# Patient Record
Sex: MALE | Race: WHITE | NOT HISPANIC OR LATINO | URBAN - METROPOLITAN AREA
[De-identification: names, ages, dates, MRNs, and addresses within clinical notes are randomized per-mention and may not be internally consistent; named-entity substitution may affect disease eponyms.]

---

## 2017-07-30 ENCOUNTER — INPATIENT (INPATIENT)
Facility: HOSPITAL | Age: 37
LOS: 2 days | Discharge: HOME | End: 2017-08-02
Attending: SURGERY | Admitting: SURGERY
Payer: COMMERCIAL

## 2017-07-30 VITALS
RESPIRATION RATE: 18 BRPM | TEMPERATURE: 103 F | HEART RATE: 128 BPM | SYSTOLIC BLOOD PRESSURE: 100 MMHG | OXYGEN SATURATION: 100 % | DIASTOLIC BLOOD PRESSURE: 50 MMHG

## 2017-07-30 DIAGNOSIS — K61.1 RECTAL ABSCESS: ICD-10-CM

## 2017-07-30 LAB
ALBUMIN SERPL ELPH-MCNC: 4.2 G/DL — SIGNIFICANT CHANGE UP (ref 3.3–5)
ALP SERPL-CCNC: 52 U/L — SIGNIFICANT CHANGE UP (ref 40–120)
ALT FLD-CCNC: 17 U/L — SIGNIFICANT CHANGE UP (ref 4–41)
AST SERPL-CCNC: 13 U/L — SIGNIFICANT CHANGE UP (ref 4–40)
BASE EXCESS BLDV CALC-SCNC: -0.5 MMOL/L — SIGNIFICANT CHANGE UP
BASE EXCESS BLDV CALC-SCNC: 3.9 MMOL/L — SIGNIFICANT CHANGE UP
BASOPHILS # BLD AUTO: 0.04 K/UL — SIGNIFICANT CHANGE UP (ref 0–0.2)
BASOPHILS NFR BLD AUTO: 0.2 % — SIGNIFICANT CHANGE UP (ref 0–2)
BILIRUB SERPL-MCNC: 1.3 MG/DL — HIGH (ref 0.2–1.2)
BLOOD GAS VENOUS - CREATININE: 0.98 MG/DL — SIGNIFICANT CHANGE UP (ref 0.5–1.3)
BLOOD GAS VENOUS - CREATININE: 1.17 MG/DL — SIGNIFICANT CHANGE UP (ref 0.5–1.3)
BUN SERPL-MCNC: 10 MG/DL — SIGNIFICANT CHANGE UP (ref 7–23)
CALCIUM SERPL-MCNC: 9 MG/DL — SIGNIFICANT CHANGE UP (ref 8.4–10.5)
CHLORIDE BLDV-SCNC: 103 MMOL/L — SIGNIFICANT CHANGE UP (ref 96–108)
CHLORIDE BLDV-SCNC: 106 MMOL/L — SIGNIFICANT CHANGE UP (ref 96–108)
CHLORIDE SERPL-SCNC: 99 MMOL/L — SIGNIFICANT CHANGE UP (ref 98–107)
CO2 SERPL-SCNC: 27 MMOL/L — SIGNIFICANT CHANGE UP (ref 22–31)
CREAT SERPL-MCNC: 1.22 MG/DL — SIGNIFICANT CHANGE UP (ref 0.5–1.3)
EOSINOPHIL # BLD AUTO: 0.03 K/UL — SIGNIFICANT CHANGE UP (ref 0–0.5)
EOSINOPHIL NFR BLD AUTO: 0.2 % — SIGNIFICANT CHANGE UP (ref 0–6)
GAS PNL BLDV: 134 MMOL/L — LOW (ref 136–146)
GAS PNL BLDV: 137 MMOL/L — SIGNIFICANT CHANGE UP (ref 136–146)
GLUCOSE BLDV-MCNC: 159 — HIGH (ref 70–99)
GLUCOSE BLDV-MCNC: 223 — HIGH (ref 70–99)
GLUCOSE SERPL-MCNC: 208 MG/DL — HIGH (ref 70–99)
GRAM STN SPEC: SIGNIFICANT CHANGE UP
HCO3 BLDV-SCNC: 24 MMOL/L — SIGNIFICANT CHANGE UP (ref 20–27)
HCO3 BLDV-SCNC: 27 MMOL/L — SIGNIFICANT CHANGE UP (ref 20–27)
HCT VFR BLD CALC: 41.4 % — SIGNIFICANT CHANGE UP (ref 39–50)
HCT VFR BLDV CALC: 37.4 % — LOW (ref 39–51)
HCT VFR BLDV CALC: 45.2 % — SIGNIFICANT CHANGE UP (ref 39–51)
HGB BLD-MCNC: 14.4 G/DL — SIGNIFICANT CHANGE UP (ref 13–17)
HGB BLDV-MCNC: 12.1 G/DL — LOW (ref 13–17)
HGB BLDV-MCNC: 14.7 G/DL — SIGNIFICANT CHANGE UP (ref 13–17)
IMM GRANULOCYTES # BLD AUTO: 0.09 # — SIGNIFICANT CHANGE UP
IMM GRANULOCYTES NFR BLD AUTO: 0.6 % — SIGNIFICANT CHANGE UP (ref 0–1.5)
LACTATE BLDV-MCNC: 1.5 MMOL/L — SIGNIFICANT CHANGE UP (ref 0.5–2)
LACTATE BLDV-MCNC: 2.3 MMOL/L — HIGH (ref 0.5–2)
LYMPHOCYTES # BLD AUTO: 0.79 K/UL — LOW (ref 1–3.3)
LYMPHOCYTES # BLD AUTO: 4.8 % — LOW (ref 13–44)
MCHC RBC-ENTMCNC: 29.6 PG — SIGNIFICANT CHANGE UP (ref 27–34)
MCHC RBC-ENTMCNC: 34.8 % — SIGNIFICANT CHANGE UP (ref 32–36)
MCV RBC AUTO: 85.2 FL — SIGNIFICANT CHANGE UP (ref 80–100)
MONOCYTES # BLD AUTO: 1.26 K/UL — HIGH (ref 0–0.9)
MONOCYTES NFR BLD AUTO: 7.7 % — SIGNIFICANT CHANGE UP (ref 2–14)
NEUTROPHILS # BLD AUTO: 14.14 K/UL — HIGH (ref 1.8–7.4)
NEUTROPHILS NFR BLD AUTO: 86.5 % — HIGH (ref 43–77)
NRBC # FLD: 0 — SIGNIFICANT CHANGE UP
PCO2 BLDV: 38 MMHG — LOW (ref 41–51)
PCO2 BLDV: 41 MMHG — SIGNIFICANT CHANGE UP (ref 41–51)
PH BLDV: 7.41 PH — SIGNIFICANT CHANGE UP (ref 7.32–7.43)
PH BLDV: 7.45 PH — HIGH (ref 7.32–7.43)
PLATELET # BLD AUTO: 215 K/UL — SIGNIFICANT CHANGE UP (ref 150–400)
PMV BLD: 9.7 FL — SIGNIFICANT CHANGE UP (ref 7–13)
PO2 BLDV: 28 MMHG — LOW (ref 35–40)
PO2 BLDV: 47 MMHG — HIGH (ref 35–40)
POTASSIUM BLDV-SCNC: 3.4 MMOL/L — SIGNIFICANT CHANGE UP (ref 3.4–4.5)
POTASSIUM BLDV-SCNC: 3.9 MMOL/L — SIGNIFICANT CHANGE UP (ref 3.4–4.5)
POTASSIUM SERPL-MCNC: 4.1 MMOL/L — SIGNIFICANT CHANGE UP (ref 3.5–5.3)
POTASSIUM SERPL-SCNC: 4.1 MMOL/L — SIGNIFICANT CHANGE UP (ref 3.5–5.3)
PROT SERPL-MCNC: 7.2 G/DL — SIGNIFICANT CHANGE UP (ref 6–8.3)
RBC # BLD: 4.86 M/UL — SIGNIFICANT CHANGE UP (ref 4.2–5.8)
RBC # FLD: 12.7 % — SIGNIFICANT CHANGE UP (ref 10.3–14.5)
SAO2 % BLDV: 54 % — LOW (ref 60–85)
SAO2 % BLDV: 83.6 % — SIGNIFICANT CHANGE UP (ref 60–85)
SODIUM SERPL-SCNC: 139 MMOL/L — SIGNIFICANT CHANGE UP (ref 135–145)
SPECIMEN SOURCE: SIGNIFICANT CHANGE UP
WBC # BLD: 16.35 K/UL — HIGH (ref 3.8–10.5)
WBC # FLD AUTO: 16.35 K/UL — HIGH (ref 3.8–10.5)

## 2017-07-30 PROCEDURE — 74177 CT ABD & PELVIS W/CONTRAST: CPT | Mod: 26

## 2017-07-30 PROCEDURE — 45005 DRAINAGE OF RECTAL ABSCESS: CPT

## 2017-07-30 PROCEDURE — 99253 IP/OBS CNSLTJ NEW/EST LOW 45: CPT | Mod: 25

## 2017-07-30 RX ORDER — HYDROMORPHONE HYDROCHLORIDE 2 MG/ML
1 INJECTION INTRAMUSCULAR; INTRAVENOUS; SUBCUTANEOUS ONCE
Qty: 0 | Refills: 0 | Status: DISCONTINUED | OUTPATIENT
Start: 2017-07-30 | End: 2017-07-30

## 2017-07-30 RX ORDER — IBUPROFEN 200 MG
600 TABLET ORAL ONCE
Qty: 0 | Refills: 0 | Status: COMPLETED | OUTPATIENT
Start: 2017-07-30 | End: 2017-07-30

## 2017-07-30 RX ORDER — SODIUM CHLORIDE 9 MG/ML
2000 INJECTION INTRAMUSCULAR; INTRAVENOUS; SUBCUTANEOUS ONCE
Qty: 0 | Refills: 0 | Status: COMPLETED | OUTPATIENT
Start: 2017-07-30 | End: 2017-07-30

## 2017-07-30 RX ORDER — PIPERACILLIN AND TAZOBACTAM 4; .5 G/20ML; G/20ML
3.38 INJECTION, POWDER, LYOPHILIZED, FOR SOLUTION INTRAVENOUS ONCE
Qty: 0 | Refills: 0 | Status: COMPLETED | OUTPATIENT
Start: 2017-07-30 | End: 2017-07-30

## 2017-07-30 RX ORDER — MORPHINE SULFATE 50 MG/1
4 CAPSULE, EXTENDED RELEASE ORAL ONCE
Qty: 0 | Refills: 0 | Status: DISCONTINUED | OUTPATIENT
Start: 2017-07-30 | End: 2017-07-30

## 2017-07-30 RX ORDER — PIPERACILLIN AND TAZOBACTAM 4; .5 G/20ML; G/20ML
3.38 INJECTION, POWDER, LYOPHILIZED, FOR SOLUTION INTRAVENOUS EVERY 8 HOURS
Qty: 0 | Refills: 0 | Status: DISCONTINUED | OUTPATIENT
Start: 2017-07-30 | End: 2017-07-30

## 2017-07-30 RX ORDER — ENOXAPARIN SODIUM 100 MG/ML
40 INJECTION SUBCUTANEOUS DAILY
Qty: 0 | Refills: 0 | Status: DISCONTINUED | OUTPATIENT
Start: 2017-07-30 | End: 2017-08-02

## 2017-07-30 RX ORDER — METRONIDAZOLE 500 MG
500 TABLET ORAL EVERY 8 HOURS
Qty: 0 | Refills: 0 | Status: DISCONTINUED | OUTPATIENT
Start: 2017-07-30 | End: 2017-07-31

## 2017-07-30 RX ORDER — CIPROFLOXACIN LACTATE 400MG/40ML
500 VIAL (ML) INTRAVENOUS EVERY 12 HOURS
Qty: 0 | Refills: 0 | Status: DISCONTINUED | OUTPATIENT
Start: 2017-07-30 | End: 2017-07-31

## 2017-07-30 RX ORDER — ACETAMINOPHEN 500 MG
975 TABLET ORAL ONCE
Qty: 0 | Refills: 0 | Status: COMPLETED | OUTPATIENT
Start: 2017-07-30 | End: 2017-07-30

## 2017-07-30 RX ORDER — ACETAMINOPHEN 500 MG
650 TABLET ORAL ONCE
Qty: 0 | Refills: 0 | Status: COMPLETED | OUTPATIENT
Start: 2017-07-30 | End: 2017-07-30

## 2017-07-30 RX ADMIN — Medication 650 MILLIGRAM(S): at 23:25

## 2017-07-30 RX ADMIN — Medication 600 MILLIGRAM(S): at 13:24

## 2017-07-30 RX ADMIN — MORPHINE SULFATE 4 MILLIGRAM(S): 50 CAPSULE, EXTENDED RELEASE ORAL at 10:55

## 2017-07-30 RX ADMIN — HYDROMORPHONE HYDROCHLORIDE 1 MILLIGRAM(S): 2 INJECTION INTRAMUSCULAR; INTRAVENOUS; SUBCUTANEOUS at 14:17

## 2017-07-30 RX ADMIN — Medication 500 MILLIGRAM(S): at 21:36

## 2017-07-30 RX ADMIN — PIPERACILLIN AND TAZOBACTAM 200 GRAM(S): 4; .5 INJECTION, POWDER, LYOPHILIZED, FOR SOLUTION INTRAVENOUS at 11:05

## 2017-07-30 RX ADMIN — SODIUM CHLORIDE 1000 MILLILITER(S): 9 INJECTION INTRAMUSCULAR; INTRAVENOUS; SUBCUTANEOUS at 10:16

## 2017-07-30 RX ADMIN — Medication 500 MILLIGRAM(S): at 19:13

## 2017-07-30 RX ADMIN — Medication 650 MILLIGRAM(S): at 22:38

## 2017-07-30 RX ADMIN — Medication 975 MILLIGRAM(S): at 10:02

## 2017-07-30 RX ADMIN — MORPHINE SULFATE 4 MILLIGRAM(S): 50 CAPSULE, EXTENDED RELEASE ORAL at 10:40

## 2017-07-30 NOTE — H&P ADULT - NSHPPHYSICALEXAM_GEN_ALL_CORE
General: A&Ox3, NAD.  Neuro: CN II-XII intact, motor and sensory - grossly intact w/ no focal deficits.  HEENT: Normocephalic, atraumatic, EOMI, anicteric sclerae.  Respiratory: Clear to auscultation bilaterally, w/ unlabored breathing.   CVS: Tachycardic to low 100's  Abdomen: Soft, non-distended, non-tender. No rebound, no guarding. No palpable organomegaly or masses.   Rectum: Area of induration and fluctuance medially towards anal verge. Erythematous. No blood on LAKHWINDER.   Extremities: Warm bilaterally w/ palpable pulses.   MSK: Intact ROM.

## 2017-07-30 NOTE — H&P ADULT - HISTORY OF PRESENT ILLNESS
36yo male history of anal fissures presents with pain after defecation, found to have a perirectal abscess. Reports fevers at home and was febrile to 39.3 upon arrival to ED. Denies abdominal pain, diarrhea/constipation, blood in stool. Has had an anal fissure in the past that resolved. Family history non-significant for IBD or colon cancer.

## 2017-07-30 NOTE — H&P ADULT - NSHPLABSRESULTS_GEN_ALL_CORE
CBC Full  -  ( 30 Jul 2017 10:09 )  WBC Count : 16.35 K/uL  Hemoglobin : 14.4 g/dL  Hematocrit : 41.4 %  Platelet Count - Automated : 215 K/uL  Mean Cell Volume : 85.2 fL  Mean Cell Hemoglobin : 29.6 pg  Mean Cell Hemoglobin Concentration : 34.8 %  Auto Neutrophil # : 14.14 K/uL  Auto Lymphocyte # : 0.79 K/uL  Auto Monocyte # : 1.26 K/uL  Auto Eosinophil # : 0.03 K/uL  Auto Basophil # : 0.04 K/uL  Auto Neutrophil % : 86.5 %  Auto Lymphocyte % : 4.8 %  Auto Monocyte % : 7.7 %  Auto Eosinophil % : 0.2 %  Auto Basophil % : 0.2 %    07-30    139  |  99  |  10  ----------------------------<  208<H>  4.1   |  27  |  1.22    Ca    9.0      30 Jul 2017 10:09    TPro  7.2  /  Alb  4.2  /  TBili  1.3<H>  /  DBili  x   /  AST  13  /  ALT  17  /  AlkPhos  52  07-30    LIVER FUNCTIONS - ( 30 Jul 2017 10:09 )  Alb: 4.2 g/dL / Pro: 7.2 g/dL / ALK PHOS: 52 u/L / ALT: 17 u/L / AST: 13 u/L / GGT: x             RADIOLOGY:  CT Abdomen and Pelvis w/ IV Cont (07.30.17 @ 11:30)   BOWEL: 3.3 x 2.8 x 2.6 cm mottled fluid and gas collection in the left   perirectal space with surrounding inflammation. No bowel obstruction.   Appendix is normal.  PERITONEUM: No ascites.  VESSELS: Within normal limits.  RETROPERITONEUM: No lymphadenopathy.    ABDOMINAL WALL: Within normal limits.  BONES: Within normal limits    IMPRESSION: Left perirectal fluid and gas collection.

## 2017-07-30 NOTE — CONSULT NOTE ADULT - SUBJECTIVE AND OBJECTIVE BOX
General Surgery Consult Note / B Team  Pager [phone]    HPI:  38yo male history of anal fissures presents with pain after defecation, found to have a perirectal abscess. Reports fevers at home and was febrile to 39.3 upon arrival to ED. Denies abdominal pain, diarrhea/constipation, blood in stool. Has had an anal fissure in the past that resolved.     PAST MEDICAL & SURGICAL HISTORY:  Depression  Anxiety  No significant past surgical history      MEDICATIONS:  None  ___________________________________________  Review of Systems:  Constitutional: FEVERS, no chills, no recent weight loss  ENMT: No changes in hearing, no changes in vision, no sore throat, no cough  Respiratory: No shortness of breath  Cardiovascular: No chest pain, palpitations  Gastrointestinal: No abdominal pain, no diarrhea/constipation, no blood in stool, PAIN WITH DEFECATION  Genitourinary: No dysuria, frequency, or urgency    Extremities: No joint swelling, no limited range of movement  Neurological: No paresthesia  Skin: No rashes    ___________________________________________  Vital Signs Last 24 Hrs  T(C): 36.6 (30 Jul 2017 15:32), Max: 39.3 (30 Jul 2017 09:49)  T(F): 97.9 (30 Jul 2017 15:32), Max: 102.8 (30 Jul 2017 09:49)  HR: 98 (30 Jul 2017 15:32) (98 - 128)  BP: 101/58 (30 Jul 2017 15:32) (100/50 - 130/60)  BP(mean): --  RR: 18 (30 Jul 2017 15:32) (16 - 18)  SpO2: 99% (30 Jul 2017 15:32) (99% - 100%)CAPILLARY BLOOD GLUCOSE      General: No acute distress  Rectum: Area of induration and fluctuance medially towards anal verge. Erythematous. No blood on LAKHWINDER.   Abdomen: Soft, obese abdomen, nontender  Chest: Breathing non-labored  Cardiovascular: Tachycardic to low 100's  Extremities: Non-edematous  ____________________________________________  LABS:  CBC Full  -  ( 30 Jul 2017 10:09 )  WBC Count : 16.35 K/uL  Hemoglobin : 14.4 g/dL  Hematocrit : 41.4 %  Platelet Count - Automated : 215 K/uL  Mean Cell Volume : 85.2 fL  Mean Cell Hemoglobin : 29.6 pg  Mean Cell Hemoglobin Concentration : 34.8 %  Auto Neutrophil # : 14.14 K/uL  Auto Lymphocyte # : 0.79 K/uL  Auto Monocyte # : 1.26 K/uL  Auto Eosinophil # : 0.03 K/uL  Auto Basophil # : 0.04 K/uL  Auto Neutrophil % : 86.5 %  Auto Lymphocyte % : 4.8 %  Auto Monocyte % : 7.7 %  Auto Eosinophil % : 0.2 %  Auto Basophil % : 0.2 %    07-30    139  |  99  |  10  ----------------------------<  208<H>  4.1   |  27  |  1.22    Ca    9.0      30 Jul 2017 10:09    TPro  7.2  /  Alb  4.2  /  TBili  1.3<H>  /  DBili  x   /  AST  13  /  ALT  17  /  AlkPhos  52  07-30    LIVER FUNCTIONS - ( 30 Jul 2017 10:09 )  Alb: 4.2 g/dL / Pro: 7.2 g/dL / ALK PHOS: 52 u/L / ALT: 17 u/L / AST: 13 u/L / GGT: x         ____________________________________________  RADIOLOGY:  CT Abdomen and Pelvis w/ IV Cont (07.30.17 @ 11:30)   OWEL: 3.3 x 2.8 x 2.6 cm mottled fluid and gas collection in the left   perirectal space with surrounding inflammation. No bowel obstruction.   Appendix is normal.  PERITONEUM: No ascites.  VESSELS: Within normal limits.  RETROPERITONEUM: No lymphadenopathy.    ABDOMINAL WALL: Within normal limits.  BONES: Within normal limits    IMPRESSION: Left perirectal fluid and gas collection. General Surgery Consult Note / B Team  Pager [phone]    HPI:  38yo male history of anal fissures presents with pain after defecation, found to have a perirectal abscess. Reports fevers at home and was febrile to 39.3 upon arrival to ED. Denies abdominal pain, diarrhea/constipation, blood in stool. Has had an anal fissure in the past that resolved. Family history non-significant for IBD or colon cancer.     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    FAMILY HISTORY:  Grandfather - hemorrhoids  ________________________________________  Review of Systems:  Constitutional: FEVERS, no chills, no recent weight loss  ENMT: No changes in hearing, no changes in vision, no sore throat, no cough  Respiratory: No shortness of breath  Cardiovascular: No chest pain, palpitations  Gastrointestinal: No abdominal pain, no diarrhea/constipation, no blood in stool, PAIN WITH DEFECATION  Genitourinary: No dysuria, frequency, or urgency    Extremities: No joint swelling, no limited range of movement  Neurological: No paresthesia  Skin: No rashes    ___________________________________________  Vital Signs Last 24 Hrs  T(C): 36.6 (30 Jul 2017 15:32), Max: 39.3 (30 Jul 2017 09:49)  T(F): 97.9 (30 Jul 2017 15:32), Max: 102.8 (30 Jul 2017 09:49)  HR: 98 (30 Jul 2017 15:32) (98 - 128)  BP: 101/58 (30 Jul 2017 15:32) (100/50 - 130/60)  BP(mean): --  RR: 18 (30 Jul 2017 15:32) (16 - 18)  SpO2: 99% (30 Jul 2017 15:32) (99% - 100%)CAPILLARY BLOOD GLUCOSE      General: No acute distress  Rectum: Area of induration and fluctuance medially towards anal verge. Erythematous. No blood on LAKHWINDER.   Abdomen: Soft, obese abdomen, nontender  Chest: Breathing non-labored  Cardiovascular: Tachycardic to low 100's  Extremities: Non-edematous  ____________________________________________  LABS:  CBC Full  -  ( 30 Jul 2017 10:09 )  WBC Count : 16.35 K/uL  Hemoglobin : 14.4 g/dL  Hematocrit : 41.4 %  Platelet Count - Automated : 215 K/uL  Mean Cell Volume : 85.2 fL  Mean Cell Hemoglobin : 29.6 pg  Mean Cell Hemoglobin Concentration : 34.8 %  Auto Neutrophil # : 14.14 K/uL  Auto Lymphocyte # : 0.79 K/uL  Auto Monocyte # : 1.26 K/uL  Auto Eosinophil # : 0.03 K/uL  Auto Basophil # : 0.04 K/uL  Auto Neutrophil % : 86.5 %  Auto Lymphocyte % : 4.8 %  Auto Monocyte % : 7.7 %  Auto Eosinophil % : 0.2 %  Auto Basophil % : 0.2 %    07-30    139  |  99  |  10  ----------------------------<  208<H>  4.1   |  27  |  1.22    Ca    9.0      30 Jul 2017 10:09    TPro  7.2  /  Alb  4.2  /  TBili  1.3<H>  /  DBili  x   /  AST  13  /  ALT  17  /  AlkPhos  52  07-30    LIVER FUNCTIONS - ( 30 Jul 2017 10:09 )  Alb: 4.2 g/dL / Pro: 7.2 g/dL / ALK PHOS: 52 u/L / ALT: 17 u/L / AST: 13 u/L / GGT: x         ____________________________________________  RADIOLOGY:  CT Abdomen and Pelvis w/ IV Cont (07.30.17 @ 11:30)   OWEL: 3.3 x 2.8 x 2.6 cm mottled fluid and gas collection in the left   perirectal space with surrounding inflammation. No bowel obstruction.   Appendix is normal.  PERITONEUM: No ascites.  VESSELS: Within normal limits.  RETROPERITONEUM: No lymphadenopathy.    ABDOMINAL WALL: Within normal limits.  BONES: Within normal limits    IMPRESSION: Left perirectal fluid and gas collection.

## 2017-07-30 NOTE — H&P ADULT - PROBLEM SELECTOR PLAN 1
- Admit for observation for fevers  - Discharge with Cipro and Flagyl x 5 days total  - Daily packing changes and sitz baths. Patient's sister, who is a nurse, will assist with dressing changes at home.  - Regular diet  - DVT ppx

## 2017-07-30 NOTE — ED ADULT NURSE NOTE - OBJECTIVE STATEMENT
pt received a&ox3, c/o weakness/dizziness/rectal abscess and pain to area x 3 days, pt states he has hx of rectal fissures, denies hx of abscess, vitals septic in triage, temp normal in room after Tylenol, pt noted to be warm to touch, c/o chills, pt appears to be in NAD, denies cp/sob/nvd, vs as reported, 20 gauge IV placed in right and left ac, labs drawn and sent, pt placed on monitor, will continue to monitor.

## 2017-07-30 NOTE — ED PROVIDER NOTE - ATTENDING CONTRIBUTION TO CARE
AJM: Pt is 37 year old male with rectal pain. Symptoms present for the past three days. Worse with sitting and defecating. Now with fever. No rectal trauma, intercourse, drainage. Never had this before. + abscess noted on exam at 5:00 position and exquisite internal ttp. Abx, labs, CT with IV contrast, surgery consult. likely admit

## 2017-07-30 NOTE — H&P ADULT - ASSESSMENT
Assessment/Plan: 37y Male otherwise healthy presents with a 3x3x3 perirectal abscess. +Fever and leukocytosis. Abscess was drained at bedside (See procedure note) with cultures taken.

## 2017-07-30 NOTE — H&P ADULT - NSHPREVIEWOFSYSTEMS_GEN_ALL_CORE
Constitutional: FEVERS, no chills, no recent weight loss  ENMT: No changes in hearing, no changes in vision, no sore throat, no cough  Respiratory: No shortness of breath  Cardiovascular: No chest pain, palpitations  Gastrointestinal: No abdominal pain, no diarrhea/constipation, no blood in stool, PAIN WITH DEFECATION  Genitourinary: No dysuria, frequency, or urgency    Extremities: No joint swelling, no limited range of movement  Neurological: No paresthesia  Skin: No rashes

## 2017-07-30 NOTE — ED PROVIDER NOTE - PROGRESS NOTE DETAILS
patient was seen by surgery consult. Plan for bedside I&D and reassessment. AJM: pt to be admitted for IV abx and observation

## 2017-07-30 NOTE — ED PROVIDER NOTE - OBJECTIVE STATEMENT
37M with PMH of anxiety and depression presents to ED with rectal pain for 4 days. Associated symptoms of fever and chills x1 day. Denies any trauma or inciting events that may have triggered the pain. States he is sedentary most of the day due to work and also has been experiencing from straining when defecating. Has 1 history of rectal pain in the past 2/2 rectal fissure however, states the pain is different. Denies CP, SOB, purulence, hematochezia. 37M with PMH of anxiety and depression presents to ED with rectal pain for 4 days. Associated symptoms of fever and chills x1 day. Denies any trauma or inciting events that may have triggered the pain. States he is sedentary most of the day due to work and also has been experiencing pain from straining when defecating. Has 1 history of rectal pain in the past 2/2 rectal fissure however, states the pain is different. Denies CP, SOB, purulence, hematochezia.

## 2017-07-30 NOTE — CONSULT NOTE ADULT - ASSESSMENT
Assessment/Plan: 37y Male otherwise healthy presents with a 3x3x3 perirectal abscess. +Fever and leukocytosis. Abscess was drained at bedside (See procedure note).    - Cipro and Flagyl  - Daily packing changes and sitz baths  - Follow up with Dr. Arteaga in 1 week    Pager [phone] Assessment/Plan: 37y Male otherwise healthy presents with a 3x3x3 perirectal abscess. +Fever and leukocytosis. Abscess was drained at bedside (See procedure note) with cultures taken.    - Observe in CDU for 24 hours. Discharge with Cipro and Flagyl x 5 days  - Daily packing changes and sitz baths. Patient's sister, who is a nurse, will assist with dressing changes.  - Follow up with Dr. Arteaga in 1 week    Discussed with Dr. Arteaga  Pager [phone]

## 2017-07-30 NOTE — ED PROVIDER NOTE - MEDICAL DECISION MAKING DETAILS
37M presenting with rectal pain x4 days with no prior history of similar symptoms. On exam, area of fluctuance and enlargement noted perirectally and present inside rectum as well upon rectal exam.  Likely rectal abscess. Will obtain CT abd/pelvis w/ con and surgery consultation.   morphine 4mg iv for pain and tylenol for fever. 37M presenting with rectal pain x4 days with no prior history of similar symptoms. On exam, area of fluctuance and enlargement noted in perianal/rectal and present inside rectum as well upon rectal exam.  Likely rectal abscess. Will obtain CT abd/pelvis w/ con and surgery consultation.   morphine 4mg iv for pain and tylenol for fever.

## 2017-07-31 LAB
APTT BLD: 29.9 SEC — SIGNIFICANT CHANGE UP (ref 27.5–37.4)
BLD GP AB SCN SERPL QL: NEGATIVE — SIGNIFICANT CHANGE UP
HCT VFR BLD CALC: 34.4 % — LOW (ref 39–50)
HCT VFR BLD CALC: 35.1 % — LOW (ref 39–50)
HGB BLD-MCNC: 11.8 G/DL — LOW (ref 13–17)
HGB BLD-MCNC: 12 G/DL — LOW (ref 13–17)
INR BLD: 1.57 — HIGH (ref 0.88–1.17)
MCHC RBC-ENTMCNC: 28.6 PG — SIGNIFICANT CHANGE UP (ref 27–34)
MCHC RBC-ENTMCNC: 29.2 PG — SIGNIFICANT CHANGE UP (ref 27–34)
MCHC RBC-ENTMCNC: 34.2 % — SIGNIFICANT CHANGE UP (ref 32–36)
MCHC RBC-ENTMCNC: 34.3 % — SIGNIFICANT CHANGE UP (ref 32–36)
MCV RBC AUTO: 83.5 FL — SIGNIFICANT CHANGE UP (ref 80–100)
MCV RBC AUTO: 85.4 FL — SIGNIFICANT CHANGE UP (ref 80–100)
NRBC # FLD: 0 — SIGNIFICANT CHANGE UP
NRBC # FLD: 0 — SIGNIFICANT CHANGE UP
PLATELET # BLD AUTO: 171 K/UL — SIGNIFICANT CHANGE UP (ref 150–400)
PLATELET # BLD AUTO: 180 K/UL — SIGNIFICANT CHANGE UP (ref 150–400)
PMV BLD: 10 FL — SIGNIFICANT CHANGE UP (ref 7–13)
PMV BLD: 10.1 FL — SIGNIFICANT CHANGE UP (ref 7–13)
PROTHROM AB SERPL-ACNC: 17.7 SEC — HIGH (ref 9.8–13.1)
RBC # BLD: 4.11 M/UL — LOW (ref 4.2–5.8)
RBC # BLD: 4.12 M/UL — LOW (ref 4.2–5.8)
RBC # FLD: 12.4 % — SIGNIFICANT CHANGE UP (ref 10.3–14.5)
RBC # FLD: 12.8 % — SIGNIFICANT CHANGE UP (ref 10.3–14.5)
RH IG SCN BLD-IMP: POSITIVE — SIGNIFICANT CHANGE UP
SPECIMEN SOURCE: SIGNIFICANT CHANGE UP
SPECIMEN SOURCE: SIGNIFICANT CHANGE UP
WBC # BLD: 11.18 K/UL — HIGH (ref 3.8–10.5)
WBC # BLD: 14.07 K/UL — HIGH (ref 3.8–10.5)
WBC # FLD AUTO: 11.18 K/UL — HIGH (ref 3.8–10.5)
WBC # FLD AUTO: 14.07 K/UL — HIGH (ref 3.8–10.5)

## 2017-07-31 PROCEDURE — 72193 CT PELVIS W/DYE: CPT | Mod: 26

## 2017-07-31 RX ORDER — METRONIDAZOLE 500 MG
TABLET ORAL
Qty: 0 | Refills: 0 | Status: DISCONTINUED | OUTPATIENT
Start: 2017-07-31 | End: 2017-08-02

## 2017-07-31 RX ORDER — CIPROFLOXACIN LACTATE 400MG/40ML
400 VIAL (ML) INTRAVENOUS EVERY 12 HOURS
Qty: 0 | Refills: 0 | Status: DISCONTINUED | OUTPATIENT
Start: 2017-08-01 | End: 2017-08-02

## 2017-07-31 RX ORDER — CIPROFLOXACIN LACTATE 400MG/40ML
400 VIAL (ML) INTRAVENOUS EVERY 12 HOURS
Qty: 0 | Refills: 0 | Status: DISCONTINUED | OUTPATIENT
Start: 2017-07-31 | End: 2017-07-31

## 2017-07-31 RX ORDER — CIPROFLOXACIN LACTATE 400MG/40ML
VIAL (ML) INTRAVENOUS
Qty: 0 | Refills: 0 | Status: DISCONTINUED | OUTPATIENT
Start: 2017-07-31 | End: 2017-07-31

## 2017-07-31 RX ORDER — ACETAMINOPHEN 500 MG
650 TABLET ORAL ONCE
Qty: 0 | Refills: 0 | Status: COMPLETED | OUTPATIENT
Start: 2017-07-31 | End: 2017-07-31

## 2017-07-31 RX ORDER — LIDOCAINE HCL 20 MG/ML
20 VIAL (ML) INJECTION ONCE
Qty: 0 | Refills: 0 | Status: DISCONTINUED | OUTPATIENT
Start: 2017-07-31 | End: 2017-08-01

## 2017-07-31 RX ORDER — METRONIDAZOLE 500 MG
500 TABLET ORAL EVERY 8 HOURS
Qty: 0 | Refills: 0 | Status: DISCONTINUED | OUTPATIENT
Start: 2017-07-31 | End: 2017-08-02

## 2017-07-31 RX ORDER — SODIUM CHLORIDE 9 MG/ML
1000 INJECTION, SOLUTION INTRAVENOUS
Qty: 0 | Refills: 0 | Status: DISCONTINUED | OUTPATIENT
Start: 2017-07-31 | End: 2017-08-01

## 2017-07-31 RX ORDER — SENNA PLUS 8.6 MG/1
2 TABLET ORAL AT BEDTIME
Qty: 0 | Refills: 0 | Status: DISCONTINUED | OUTPATIENT
Start: 2017-07-31 | End: 2017-08-01

## 2017-07-31 RX ORDER — DOCUSATE SODIUM 100 MG
100 CAPSULE ORAL THREE TIMES A DAY
Qty: 0 | Refills: 0 | Status: DISCONTINUED | OUTPATIENT
Start: 2017-07-31 | End: 2017-08-01

## 2017-07-31 RX ADMIN — Medication 500 MILLIGRAM(S): at 13:23

## 2017-07-31 RX ADMIN — Medication 100 MILLIGRAM(S): at 21:38

## 2017-07-31 RX ADMIN — Medication 500 MILLIGRAM(S): at 05:34

## 2017-07-31 RX ADMIN — SODIUM CHLORIDE 100 MILLILITER(S): 9 INJECTION, SOLUTION INTRAVENOUS at 19:08

## 2017-07-31 RX ADMIN — Medication 100 MILLIGRAM(S): at 13:22

## 2017-07-31 RX ADMIN — Medication 650 MILLIGRAM(S): at 19:08

## 2017-07-31 RX ADMIN — SODIUM CHLORIDE 100 MILLILITER(S): 9 INJECTION, SOLUTION INTRAVENOUS at 21:38

## 2017-07-31 RX ADMIN — Medication 500 MILLIGRAM(S): at 17:04

## 2017-07-31 RX ADMIN — SENNA PLUS 2 TABLET(S): 8.6 TABLET ORAL at 21:38

## 2017-07-31 NOTE — PROVIDER CONTACT NOTE (OTHER) - ACTION/TREATMENT ORDERED:
pt provided  sitz bath as ordered-awaiting MD to reassess pt.
MD aware, no new orders at this time.  Will continue to monitor.

## 2017-07-31 NOTE — PROGRESS NOTE ADULT - SUBJECTIVE AND OBJECTIVE BOX
PRE OPERATIVE NOTE    Pre-op Diagnosis: dena-rectal abscess  Procedure: EUA, possible perirectal abscess drainage  Surgeon: Inge Montano                              11.8   11.18 )-----------( 180      ( 31 Jul 2017 19:35 )             34.4     07-30    139  |  99  |  10  ----------------------------<  208<H>  4.1   |  27  |  1.22    Ca    9.0      30 Jul 2017 10:09    TPro  7.2  /  Alb  4.2  /  TBili  1.3<H>  /  DBili  x   /  AST  13  /  ALT  17  /  AlkPhos  52  07-30    LIVER FUNCTIONS - ( 30 Jul 2017 10:09 )  Alb: 4.2 g/dL / Pro: 7.2 g/dL / ALK PHOS: 52 u/L / ALT: 17 u/L / AST: 13 u/L / GGT: x           PT/INR - ( 31 Jul 2017 19:35 )   PT: 17.7 SEC;   INR: 1.57          PTT - ( 31 Jul 2017 19:35 )  PTT:29.9 SEC    CAPILLARY BLOOD GLUCOSE          Type & Screen: orderedx2      A/P: 37y Male planned for above procedure  NPO past midnight, except medications  IVF  Pain/nausea control  AM labs  Consent in chart  ciprofloxacin   IVPB  metroNIDAZOLE  IVPB  metroNIDAZOLE  IVPB  ciprofloxacin   IVPB

## 2017-07-31 NOTE — PROGRESS NOTE ADULT - ASSESSMENT
The procedure and its risks, benefits, and alternatives were discussed with the patient. He wished to proceed with surgery.

## 2017-07-31 NOTE — PROGRESS NOTE ADULT - ASSESSMENT
37M with dena-rectal abscess, drained at bedside    Plan:  1. Monitor fevers  2. Discharge with Cipro and Flagyl x 5 days total  3. Daily packing changes and sitz baths. Patient's sister, who is a nurse, will assist with dressing changes at home.  4. Regular diet  5. DVT ppx.

## 2017-07-31 NOTE — PROGRESS NOTE ADULT - SUBJECTIVE AND OBJECTIVE BOX
Surgery Progress note      S: Patient examined. States he feels like he needs to have a bowel movement. Has been febrile.        Vital Signs Last 24 Hrs  T(C): 38.1 (31 Jul 2017 05:33), Max: 38.6 (30 Jul 2017 12:50)  T(F): 100.6 (31 Jul 2017 05:33), Max: 101.4 (30 Jul 2017 12:50)  HR: 96 (31 Jul 2017 05:33) (91 - 107)  BP: 99/62 (31 Jul 2017 05:33) (95/60 - 114/59)  BP(mean): --  RR: 20 (31 Jul 2017 05:33) (16 - 20)  SpO2: 97% (31 Jul 2017 05:33) (97% - 100%)    Physical Exam:  Gen: Awake and alert, NAD  Rectal: Packing changed. 2 cm incision draining pus.

## 2017-08-01 LAB
APTT BLD: 31.7 SEC — SIGNIFICANT CHANGE UP (ref 27.5–37.4)
BUN SERPL-MCNC: 7 MG/DL — SIGNIFICANT CHANGE UP (ref 7–23)
CALCIUM SERPL-MCNC: 8.6 MG/DL — SIGNIFICANT CHANGE UP (ref 8.4–10.5)
CHLORIDE SERPL-SCNC: 101 MMOL/L — SIGNIFICANT CHANGE UP (ref 98–107)
CO2 SERPL-SCNC: 25 MMOL/L — SIGNIFICANT CHANGE UP (ref 22–31)
CREAT SERPL-MCNC: 1.02 MG/DL — SIGNIFICANT CHANGE UP (ref 0.5–1.3)
GLUCOSE SERPL-MCNC: 120 MG/DL — HIGH (ref 70–99)
HCT VFR BLD CALC: 35 % — LOW (ref 39–50)
HGB BLD-MCNC: 12.2 G/DL — LOW (ref 13–17)
INR BLD: 1.49 — HIGH (ref 0.88–1.17)
MAGNESIUM SERPL-MCNC: 2.2 MG/DL — SIGNIFICANT CHANGE UP (ref 1.6–2.6)
MCHC RBC-ENTMCNC: 29.5 PG — SIGNIFICANT CHANGE UP (ref 27–34)
MCHC RBC-ENTMCNC: 34.9 % — SIGNIFICANT CHANGE UP (ref 32–36)
MCV RBC AUTO: 84.7 FL — SIGNIFICANT CHANGE UP (ref 80–100)
NRBC # FLD: 0 — SIGNIFICANT CHANGE UP
PHOSPHATE SERPL-MCNC: 1.4 MG/DL — LOW (ref 2.5–4.5)
PLATELET # BLD AUTO: 189 K/UL — SIGNIFICANT CHANGE UP (ref 150–400)
PMV BLD: 10.5 FL — SIGNIFICANT CHANGE UP (ref 7–13)
POTASSIUM SERPL-MCNC: 3.7 MMOL/L — SIGNIFICANT CHANGE UP (ref 3.5–5.3)
POTASSIUM SERPL-SCNC: 3.7 MMOL/L — SIGNIFICANT CHANGE UP (ref 3.5–5.3)
PROTHROM AB SERPL-ACNC: 16.8 SEC — HIGH (ref 9.8–13.1)
RBC # BLD: 4.13 M/UL — LOW (ref 4.2–5.8)
RBC # FLD: 12.6 % — SIGNIFICANT CHANGE UP (ref 10.3–14.5)
RH IG SCN BLD-IMP: POSITIVE — SIGNIFICANT CHANGE UP
SODIUM SERPL-SCNC: 137 MMOL/L — SIGNIFICANT CHANGE UP (ref 135–145)
SPECIMEN SOURCE: SIGNIFICANT CHANGE UP
WBC # BLD: 10.73 K/UL — HIGH (ref 3.8–10.5)
WBC # FLD AUTO: 10.73 K/UL — HIGH (ref 3.8–10.5)

## 2017-08-01 RX ORDER — HYDROMORPHONE HYDROCHLORIDE 2 MG/ML
0.5 INJECTION INTRAMUSCULAR; INTRAVENOUS; SUBCUTANEOUS ONCE
Qty: 0 | Refills: 0 | Status: DISCONTINUED | OUTPATIENT
Start: 2017-08-01 | End: 2017-08-01

## 2017-08-01 RX ORDER — POTASSIUM PHOSPHATE, MONOBASIC POTASSIUM PHOSPHATE, DIBASIC 236; 224 MG/ML; MG/ML
15 INJECTION, SOLUTION INTRAVENOUS ONCE
Qty: 0 | Refills: 0 | Status: COMPLETED | OUTPATIENT
Start: 2017-08-01 | End: 2017-08-01

## 2017-08-01 RX ADMIN — HYDROMORPHONE HYDROCHLORIDE 0.5 MILLIGRAM(S): 2 INJECTION INTRAMUSCULAR; INTRAVENOUS; SUBCUTANEOUS at 11:50

## 2017-08-01 RX ADMIN — Medication 100 MILLIGRAM(S): at 21:12

## 2017-08-01 RX ADMIN — Medication 100 MILLIGRAM(S): at 05:02

## 2017-08-01 RX ADMIN — Medication 100 MILLIGRAM(S): at 13:59

## 2017-08-01 RX ADMIN — Medication 100 MILLIGRAM(S): at 06:59

## 2017-08-01 RX ADMIN — Medication 200 MILLIGRAM(S): at 05:02

## 2017-08-01 RX ADMIN — ENOXAPARIN SODIUM 40 MILLIGRAM(S): 100 INJECTION SUBCUTANEOUS at 11:07

## 2017-08-01 RX ADMIN — Medication 200 MILLIGRAM(S): at 17:17

## 2017-08-01 RX ADMIN — HYDROMORPHONE HYDROCHLORIDE 0.5 MILLIGRAM(S): 2 INJECTION INTRAMUSCULAR; INTRAVENOUS; SUBCUTANEOUS at 11:36

## 2017-08-01 RX ADMIN — POTASSIUM PHOSPHATE, MONOBASIC POTASSIUM PHOSPHATE, DIBASIC 62.5 MILLIMOLE(S): 236; 224 INJECTION, SOLUTION INTRAVENOUS at 11:36

## 2017-08-01 NOTE — PROGRESS NOTE ADULT - ASSESSMENT
ASSESSMENT  37y male with dena-rectal abscess, s/p bedside I&D    PLAN  - consider OR planning for further debridement  - daily dressing changes  - regular diet  - pain control with PO/IV meds  - continue cipro/flagyl  - monitor fevers  - OOB, ambulate as tolerated    B Team  [id number]

## 2017-08-01 NOTE — PROGRESS NOTE ADULT - SUBJECTIVE AND OBJECTIVE BOX
38 y/o male presented with perirectal abscess, fever, leukocytosis.  Bedside I&D performed on 7/30.  Had several bowel movements yesterday.  Continues to report soreness around the affected area.  Denies nausea/vomiting, fever/chills, CP/SOB.      VITALS  T(C): 36.9 (08-01-17 @ 10:42), Max: 38.7 (07-31-17 @ 17:42)  HR: 84 (08-01-17 @ 10:42) (84 - 108)  BP: 107/86 (08-01-17 @ 10:42) (94/56 - 119/77)  BP(mean): --  RR: 18 (08-01-17 @ 10:42) (17 - 18)  SpO2: 97% (08-01-17 @ 10:42) (96% - 100%)    CAPILLARY BLOOD GLUCOSE  117 (01 Aug 2017 05:29)    Is/Os    07-31 @ 07:01  -  08-01 @ 07:00  --------------------------------------------------------  IN:    IV PiggyBack: 400 mL    lactated ringers.: 900 mL  Total IN: 1300 mL    OUT:    Voided: 3000 mL  Total OUT: 3000 mL    Total NET: -1700 mL      PHYSICAL EXAM:   General: NAD, Lying in bed comfortably  Neuro: alert, oriented x3  GI/Abd: Soft, NT/ND  Rectal: Mild induration present surrounding left dena-anal abscess incision.  Purulence expressed through incision site upon palpation.      MEDICATIONS (STANDING): enoxaparin Injectable 40 milliGRAM(s) SubCutaneous daily  docusate sodium 100 milliGRAM(s) Oral three times a day  senna 2 Tablet(s) Oral at bedtime  metroNIDAZOLE  IVPB   IV Intermittent   metroNIDAZOLE  IVPB 500 milliGRAM(s) IV Intermittent every 8 hours  ciprofloxacin   IVPB 400 milliGRAM(s) IV Intermittent every 12 hours    MEDICATIONS (PRN):bisacodyl 5 milliGRAM(s) Oral every 12 hours PRN Constipation      LABS  CBC (08-01 @ 05:30)                              12.2<L>                         10.73<H>  )----------------(  189                                         35.0<L>  CBC (07-31 @ 19:35)                              11.8<L>                         11.18<H>  )----------------(  180                                       34.4<L>    BMP (08-01 @ 05:30)             137     |  101     |  7     		Ca++ --      Ca 8.6                ---------------------------------( 120<H>		Mg 2.2                3.7     |  25      |  1.02  			Ph 1.4<L>      Coags (08-01 @ 05:30)  aPTT 31.7 / INR 1.49<H> / PT 16.8<H>  Coags (07-31 @ 19:35)  aPTT 29.9 / INR 1.57<H> / PT 17.7<H>    IMAGING STUDIES  < from: CT Pelvis w/ IV Cont (07.31.17 @ 20:54) >  IMPRESSION: Decrease in size of left perianal collection. Surrounding   extraluminal gas extending through the external sphincter and into the   left perineal fat is again noted.

## 2017-08-02 ENCOUNTER — TRANSCRIPTION ENCOUNTER (OUTPATIENT)
Age: 37
End: 2017-08-02

## 2017-08-02 VITALS
DIASTOLIC BLOOD PRESSURE: 60 MMHG | TEMPERATURE: 98 F | OXYGEN SATURATION: 100 % | HEART RATE: 75 BPM | RESPIRATION RATE: 16 BRPM | SYSTOLIC BLOOD PRESSURE: 120 MMHG

## 2017-08-02 LAB
BUN SERPL-MCNC: 7 MG/DL — SIGNIFICANT CHANGE UP (ref 7–23)
CALCIUM SERPL-MCNC: 8.8 MG/DL — SIGNIFICANT CHANGE UP (ref 8.4–10.5)
CHLORIDE SERPL-SCNC: 103 MMOL/L — SIGNIFICANT CHANGE UP (ref 98–107)
CO2 SERPL-SCNC: 24 MMOL/L — SIGNIFICANT CHANGE UP (ref 22–31)
CREAT SERPL-MCNC: 1.03 MG/DL — SIGNIFICANT CHANGE UP (ref 0.5–1.3)
GLUCOSE SERPL-MCNC: 106 MG/DL — HIGH (ref 70–99)
GRAM STN SPEC: SIGNIFICANT CHANGE UP
HCT VFR BLD CALC: 35.9 % — LOW (ref 39–50)
HGB BLD-MCNC: 12.1 G/DL — LOW (ref 13–17)
MAGNESIUM SERPL-MCNC: 2.3 MG/DL — SIGNIFICANT CHANGE UP (ref 1.6–2.6)
MCHC RBC-ENTMCNC: 28.3 PG — SIGNIFICANT CHANGE UP (ref 27–34)
MCHC RBC-ENTMCNC: 33.7 % — SIGNIFICANT CHANGE UP (ref 32–36)
MCV RBC AUTO: 84.1 FL — SIGNIFICANT CHANGE UP (ref 80–100)
METHOD TYPE: SIGNIFICANT CHANGE UP
NRBC # FLD: 0 — SIGNIFICANT CHANGE UP
ORGANISM # SPEC MICROSCOPIC CNT: SIGNIFICANT CHANGE UP
PHOSPHATE SERPL-MCNC: 2.6 MG/DL — SIGNIFICANT CHANGE UP (ref 2.5–4.5)
PLATELET # BLD AUTO: 246 K/UL — SIGNIFICANT CHANGE UP (ref 150–400)
PMV BLD: 10.1 FL — SIGNIFICANT CHANGE UP (ref 7–13)
POTASSIUM SERPL-MCNC: 4 MMOL/L — SIGNIFICANT CHANGE UP (ref 3.5–5.3)
POTASSIUM SERPL-SCNC: 4 MMOL/L — SIGNIFICANT CHANGE UP (ref 3.5–5.3)
RBC # BLD: 4.27 M/UL — SIGNIFICANT CHANGE UP (ref 4.2–5.8)
RBC # FLD: 13 % — SIGNIFICANT CHANGE UP (ref 10.3–14.5)
SODIUM SERPL-SCNC: 139 MMOL/L — SIGNIFICANT CHANGE UP (ref 135–145)
WBC # BLD: 8.74 K/UL — SIGNIFICANT CHANGE UP (ref 3.8–10.5)
WBC # FLD AUTO: 8.74 K/UL — SIGNIFICANT CHANGE UP (ref 3.8–10.5)

## 2017-08-02 RX ADMIN — Medication 100 MILLIGRAM(S): at 14:52

## 2017-08-02 RX ADMIN — Medication 100 MILLIGRAM(S): at 07:05

## 2017-08-02 RX ADMIN — Medication 200 MILLIGRAM(S): at 05:00

## 2017-08-02 NOTE — DISCHARGE NOTE ADULT - MEDICATION SUMMARY - MEDICATIONS TO TAKE
I will START or STAY ON the medications listed below when I get home from the hospital:    amoxicillin-clavulanate 875 mg-125 mg oral tablet  -- 1 tab(s) by mouth 2 times a day MDD:2 tabs  -- Finish all this medication unless otherwise directed by prescriber.  Take with food or milk.    -- Indication: For infection

## 2017-08-02 NOTE — PROGRESS NOTE ADULT - SUBJECTIVE AND OBJECTIVE BOX
Team Surgery Progress Note     S: Patient resting comfortably on morning rounds. Pain surrounding rectal area is well-controlled with oral pain medication. Tolerating regular diet.     MEDICATIONS  (STANDING):  enoxaparin Injectable 40 milliGRAM(s) SubCutaneous daily  metroNIDAZOLE  IVPB   IV Intermittent   metroNIDAZOLE  IVPB 500 milliGRAM(s) IV Intermittent every 8 hours  ciprofloxacin   IVPB 400 milliGRAM(s) IV Intermittent every 12 hours    MEDICATIONS  (PRN):  bisacodyl 5 milliGRAM(s) Oral every 12 hours PRN Constipation      Physical Exam:    Afebrile, HR: 70, BP: 110/64, RR: 16, 98% on RA    Vital Signs Last 24 Hrs  T(C): 37 (02 Aug 2017 05:01), Max: 37.1 (02 Aug 2017 01:21)  T(F): 98.6 (02 Aug 2017 05:01), Max: 98.8 (02 Aug 2017 01:21)  HR: 70 (02 Aug 2017 05:01) (51 - 88)  BP: 110/64 (02 Aug 2017 05:01) (99/54 - 119/78)  BP(mean): --  RR: 16 (02 Aug 2017 05:01) (16 - 18)  SpO2: 98% (02 Aug 2017 05:01) (97% - 100%)    08-01-17 @ 07:01  -  08-02-17 @ 07:00  --------------------------------------------------------  IN: 850 mL / OUT: 3650 mL / NET: -2800 mL    08-02-17 @ 07:01  -  08-02-17 @ 09:48  --------------------------------------------------------  IN: 0 mL / OUT: 250 mL / NET: -250 mL      General: NAD, AOx3    Abdominal: Soft, non-distended, non-tender  Rectal: incision / drainage site with minimal erythema and minimal continued purulent drainage, re-packed on morning rounds      LABS:                        12.1   8.74  )-----------( 246      ( 02 Aug 2017 05:30 )             35.9     08-02    139  |  103  |  7   ----------------------------<  106<H>  4.0   |  24  |  1.03    Ca    8.8      02 Aug 2017 05:30  Phos  2.6     08-02  Mg     2.3     08-02

## 2017-08-02 NOTE — DISCHARGE NOTE ADULT - PLAN OF CARE
resolution of symptoms WOUND CARE:  Please keep incisions clean and dry. Please do not Scrub or rub incisions. Do not use lotion or powder on incisions  BATHING: Please do not submerge wound underwater. You may shower and/or sponge bathe.  ACTIVITY: No heavy lifting or straining. Otherwise, you may return to your usual level of physical activity. If you are taking narcotic pain medication (such as Percocet) DO NOT drive a car, operate machinery or make important decisions.  DIET: Return to your usual diet.  NOTIFY YOUR SURGEON IF: You have any bleeding that does not stop, any pus draining from your wound(s), any fever (over 100.4 F) or chills, persistent nausea/vomiting, persistent diarrhea, or if your pain is not controlled on your discharge pain medications.  FOLLOW-UP: Please follow up with your primary care physician in one week regarding your hospitalization.  Please follow up with your surgeon, Dr. Kirit Rizzo [phone] in one to two weeks, or sooner if necessary. WOUND CARE:  Please keep incisions clean and dry. Please do not Scrub or rub incisions. Do not use lotion or powder on incisions  BATHING: Please do not submerge wound underwater. You may shower and/or sponge bathe.  ACTIVITY: No heavy lifting or straining. Otherwise, you may return to your usual level of physical activity. If you are taking narcotic pain medication (such as Percocet) DO NOT drive a car, operate machinery or make important decisions.  DIET: Return to your usual diet.  NOTIFY YOUR SURGEON IF: You have any bleeding that does not stop, any pus draining from your wound(s), any fever (over 100.4 F) or chills, persistent nausea/vomiting, persistent diarrhea, or if your pain is not controlled on your discharge pain medications.  FOLLOW-UP: Please follow up with your primary care physician in one week regarding your hospitalization.  Please follow up with your surgeon, Dr. Kirit Rizzo [phone] in ONE WEEK, or sooner if necessary.

## 2017-08-02 NOTE — DISCHARGE NOTE ADULT - PATIENT PORTAL LINK FT
“You can access the [facility] Patient Portal, offered by [facility], by registering with the following website: [email or web address]”

## 2017-08-02 NOTE — PROGRESS NOTE ADULT - ATTENDING COMMENTS
HD#2     s/p incision and drainage of perirectal abscess    Patient with febrile episode of overnight.  He complains of pain/soreness around incision site.  He also notes loose bowel movements.  He is tolerating a regular diet.  He denies nausea, vomiting, and abdominal pain    Perirectal abscess  a.  Continue packing  b.  Continue po ciprofloxacin and flagyl  c.  Sitz bath    Diarrhea  a.  Check for C. difficle stool
Perirectal abscess, s/p I&D.  Pt reports significantly improved discomfort.  He is having persistent drainage  -Draining I&D site noted on exam.  No active fluctulance or erythema.  -CT shows small 1 cm fluid collection  -Cont. abx  -pain control  -dispo planning  -We discussed the possibility of fistula formation  -Pt to f/u in office after DC

## 2017-08-02 NOTE — DISCHARGE NOTE ADULT - HOSPITAL COURSE
36 y/o male with history of anal fissures presented to the [facility] ED with pain after defecation and fevers at home up to 39.3.  He denied symptoms of abdominal pain, diarrhea/constipation, or blood in stool.  He stated that he had an anal fissure in the past that resolved.  His physical exam was significant for an area of induration and fluctuance medially toward the anal verge (left side) with erythema, but no blood on LAKHWINDER, which was determined to be a dena-anal abscess.  A bedside incision & drainage was performed.  Purulent drainage was noted and a wound culture was obtained.  The decision was made to admit the patient for observation for fevers. His antibiotic regimen has consisted of ciprofloxacin and metronidazole.  The patient's wound was packed and redressed daily. He was able to have regular bowel movements during his stay in the hospital.  A CT scan showed a decrease in size of the left perianal collection.  It was determined that he did not need operative intervention at this time.  He has been tolerating his PO intake, ambulating OOB, and reports that his pain is well-controlled.  Today, he is stable for discharge to home with oral antibiotics and visiting nurse services (to assist with wound care). 36 y/o male with history of anal fissures presented to the [facility] ED with pain after defecation and fevers at home up to 39.3.  He denied symptoms of abdominal pain, diarrhea/constipation, or blood in stool.  He stated that he had an anal fissure in the past that resolved.  His physical exam was significant for an area of induration and fluctuance medially toward the anal verge (left side) with erythema, but no blood on LAKHWINDER, which was determined to be a dena-anal abscess.  A bedside incision & drainage was performed.  Purulent drainage was noted and a wound culture was obtained.  The decision was made to admit the patient for observation for fevers. His antibiotic regimen has consisted of ciprofloxacin and metronidazole.  The patient's wound was packed and redressed daily. He was able to have regular bowel movements during his stay in the hospital.  A CT scan showed a decrease in size of the left perianal collection.  It was determined that he did not need operative intervention at this time.  He has been tolerating his PO intake, ambulating OOB, and reports that his pain is well-controlled.  Today, he is stable for discharge to home with oral antibiotics and he has agreed to take care of the wound on his own.

## 2017-08-02 NOTE — DISCHARGE NOTE ADULT - CARE PROVIDER_API CALL
Kirit Rizzo), ColonRectal Surgery; Surgery  [address] [place] [id number]  Phone: [phone]  Fax: [phone]

## 2017-08-02 NOTE — PROGRESS NOTE ADULT - ASSESSMENT
A/P: 37M s/p incision and drainage of perirectal abscess, post-procedure day #3  - continue diet as tolerated  - continue current antibiotics  - local wound care with 1 inch packing and DSD  - pain control with PO meds       B team   [id number]    - will need VNS upon discharge

## 2017-08-02 NOTE — DISCHARGE NOTE ADULT - CARE PLAN
Principal Discharge DX:	Carley-rectal abscess  Goal:	resolution of symptoms  Instructions for follow-up, activity and diet:	WOUND CARE:  Please keep incisions clean and dry. Please do not Scrub or rub incisions. Do not use lotion or powder on incisions  BATHING: Please do not submerge wound underwater. You may shower and/or sponge bathe.  ACTIVITY: No heavy lifting or straining. Otherwise, you may return to your usual level of physical activity. If you are taking narcotic pain medication (such as Percocet) DO NOT drive a car, operate machinery or make important decisions.  DIET: Return to your usual diet.  NOTIFY YOUR SURGEON IF: You have any bleeding that does not stop, any pus draining from your wound(s), any fever (over 100.4 F) or chills, persistent nausea/vomiting, persistent diarrhea, or if your pain is not controlled on your discharge pain medications.  FOLLOW-UP: Please follow up with your primary care physician in one week regarding your hospitalization.  Please follow up with your surgeon, Dr. Kirit Rizzo [phone] in one to two weeks, or sooner if necessary. Principal Discharge DX:	Carley-rectal abscess  Goal:	resolution of symptoms  Instructions for follow-up, activity and diet:	WOUND CARE:  Please keep incisions clean and dry. Please do not Scrub or rub incisions. Do not use lotion or powder on incisions  BATHING: Please do not submerge wound underwater. You may shower and/or sponge bathe.  ACTIVITY: No heavy lifting or straining. Otherwise, you may return to your usual level of physical activity. If you are taking narcotic pain medication (such as Percocet) DO NOT drive a car, operate machinery or make important decisions.  DIET: Return to your usual diet.  NOTIFY YOUR SURGEON IF: You have any bleeding that does not stop, any pus draining from your wound(s), any fever (over 100.4 F) or chills, persistent nausea/vomiting, persistent diarrhea, or if your pain is not controlled on your discharge pain medications.  FOLLOW-UP: Please follow up with your primary care physician in one week regarding your hospitalization.  Please follow up with your surgeon, Dr. Kirit Rizzo [phone] in one to two weeks, or sooner if necessary. Principal Discharge DX:	Carley-rectal abscess  Goal:	resolution of symptoms  Instructions for follow-up, activity and diet:	WOUND CARE:  Please keep incisions clean and dry. Please do not Scrub or rub incisions. Do not use lotion or powder on incisions  BATHING: Please do not submerge wound underwater. You may shower and/or sponge bathe.  ACTIVITY: No heavy lifting or straining. Otherwise, you may return to your usual level of physical activity. If you are taking narcotic pain medication (such as Percocet) DO NOT drive a car, operate machinery or make important decisions.  DIET: Return to your usual diet.  NOTIFY YOUR SURGEON IF: You have any bleeding that does not stop, any pus draining from your wound(s), any fever (over 100.4 F) or chills, persistent nausea/vomiting, persistent diarrhea, or if your pain is not controlled on your discharge pain medications.  FOLLOW-UP: Please follow up with your primary care physician in one week regarding your hospitalization.  Please follow up with your surgeon, Dr. Kirit Rizzo [phone] in ONE WEEK, or sooner if necessary. Principal Discharge DX:	Carley-rectal abscess  Goal:	resolution of symptoms  Instructions for follow-up, activity and diet:	WOUND CARE:  Please keep incisions clean and dry. Please do not Scrub or rub incisions. Do not use lotion or powder on incisions  BATHING: Please do not submerge wound underwater. You may shower and/or sponge bathe.  ACTIVITY: No heavy lifting or straining. Otherwise, you may return to your usual level of physical activity. If you are taking narcotic pain medication (such as Percocet) DO NOT drive a car, operate machinery or make important decisions.  DIET: Return to your usual diet.  NOTIFY YOUR SURGEON IF: You have any bleeding that does not stop, any pus draining from your wound(s), any fever (over 100.4 F) or chills, persistent nausea/vomiting, persistent diarrhea, or if your pain is not controlled on your discharge pain medications.  FOLLOW-UP: Please follow up with your primary care physician in one week regarding your hospitalization.  Please follow up with your surgeon, Dr. Kirit Rizzo [phone] in ONE WEEK, or sooner if necessary. Principal Discharge DX:	Carley-rectal abscess  Goal:	resolution of symptoms  Instructions for follow-up, activity and diet:	WOUND CARE:  Please keep incisions clean and dry. Please do not Scrub or rub incisions. Do not use lotion or powder on incisions  BATHING: Please do not submerge wound underwater. You may shower and/or sponge bathe.  ACTIVITY: No heavy lifting or straining. Otherwise, you may return to your usual level of physical activity. If you are taking narcotic pain medication (such as Percocet) DO NOT drive a car, operate machinery or make important decisions.  DIET: Return to your usual diet.  NOTIFY YOUR SURGEON IF: You have any bleeding that does not stop, any pus draining from your wound(s), any fever (over 100.4 F) or chills, persistent nausea/vomiting, persistent diarrhea, or if your pain is not controlled on your discharge pain medications.  FOLLOW-UP: Please follow up with your primary care physician in one week regarding your hospitalization.  Please follow up with your surgeon, Dr. Kirit Rizzo [phone] in ONE WEEK, or sooner if necessary.

## 2017-08-03 LAB
-  AMIKACIN: SIGNIFICANT CHANGE UP
-  AMPICILLIN/SULBACTAM: SIGNIFICANT CHANGE UP
-  AMPICILLIN: SIGNIFICANT CHANGE UP
-  AZTREONAM: SIGNIFICANT CHANGE UP
-  CEFAZOLIN: SIGNIFICANT CHANGE UP
-  CEFEPIME: SIGNIFICANT CHANGE UP
-  CEFOXITIN: SIGNIFICANT CHANGE UP
-  CEFTAZIDIME: SIGNIFICANT CHANGE UP
-  CEFTRIAXONE: SIGNIFICANT CHANGE UP
-  CEFTRIAXONE: SIGNIFICANT CHANGE UP
-  CIPROFLOXACIN: SIGNIFICANT CHANGE UP
-  CLINDAMYCIN: SIGNIFICANT CHANGE UP
-  ERTAPENEM: SIGNIFICANT CHANGE UP
-  ERYTHROMYCIN: SIGNIFICANT CHANGE UP
-  GENTAMICIN: SIGNIFICANT CHANGE UP
-  IMIPENEM: SIGNIFICANT CHANGE UP
-  LEVOFLOXACIN: SIGNIFICANT CHANGE UP
-  MEROPENEM: SIGNIFICANT CHANGE UP
-  PENICILLIN G: SIGNIFICANT CHANGE UP
-  PIPERACILLIN/TAZOBACTAM: SIGNIFICANT CHANGE UP
-  TIGECYCLINE: SIGNIFICANT CHANGE UP
-  TOBRAMYCIN: SIGNIFICANT CHANGE UP
-  TRIMETHOPRIM/SULFAMETHOXAZOLE: SIGNIFICANT CHANGE UP
-  VANCOMYCIN: SIGNIFICANT CHANGE UP
METHOD TYPE: SIGNIFICANT CHANGE UP
ORGANISM # SPEC MICROSCOPIC CNT: SIGNIFICANT CHANGE UP

## 2017-08-04 PROBLEM — F41.9 ANXIETY DISORDER, UNSPECIFIED: Chronic | Status: ACTIVE | Noted: 2017-07-30

## 2017-08-04 PROBLEM — F32.9 MAJOR DEPRESSIVE DISORDER, SINGLE EPISODE, UNSPECIFIED: Chronic | Status: ACTIVE | Noted: 2017-07-30

## 2017-08-04 LAB
BACTERIA BLD CULT: SIGNIFICANT CHANGE UP
BACTERIA BLD CULT: SIGNIFICANT CHANGE UP

## 2017-08-05 LAB — BACTERIA BLD CULT: SIGNIFICANT CHANGE UP

## 2017-08-07 PROBLEM — Z00.00 ENCOUNTER FOR PREVENTIVE HEALTH EXAMINATION: Status: ACTIVE | Noted: 2017-08-07

## 2017-08-10 ENCOUNTER — APPOINTMENT (OUTPATIENT)
Dept: COLORECTAL SURGERY | Facility: CLINIC | Age: 37
End: 2017-08-10
Payer: COMMERCIAL

## 2017-08-10 VITALS
DIASTOLIC BLOOD PRESSURE: 76 MMHG | SYSTOLIC BLOOD PRESSURE: 114 MMHG | WEIGHT: 200 LBS | TEMPERATURE: 98.6 F | OXYGEN SATURATION: 99 % | HEART RATE: 66 BPM | BODY MASS INDEX: 31.39 KG/M2 | HEIGHT: 67 IN

## 2017-08-10 PROCEDURE — 99213 OFFICE O/P EST LOW 20 MIN: CPT

## 2017-09-19 ENCOUNTER — APPOINTMENT (OUTPATIENT)
Dept: COLORECTAL SURGERY | Facility: CLINIC | Age: 37
End: 2017-09-19
Payer: COMMERCIAL

## 2017-09-19 DIAGNOSIS — K61.1 RECTAL ABSCESS: ICD-10-CM

## 2017-09-19 PROCEDURE — 99213 OFFICE O/P EST LOW 20 MIN: CPT

## 2017-09-28 ENCOUNTER — OUTPATIENT (OUTPATIENT)
Dept: OUTPATIENT SERVICES | Facility: HOSPITAL | Age: 37
LOS: 1 days | End: 2017-09-28

## 2017-09-28 VITALS
TEMPERATURE: 99 F | HEART RATE: 61 BPM | HEIGHT: 67 IN | WEIGHT: 195.11 LBS | SYSTOLIC BLOOD PRESSURE: 120 MMHG | RESPIRATION RATE: 16 BRPM | DIASTOLIC BLOOD PRESSURE: 84 MMHG

## 2017-09-28 DIAGNOSIS — K61.1 RECTAL ABSCESS: ICD-10-CM

## 2017-09-28 DIAGNOSIS — K60.3 ANAL FISTULA: ICD-10-CM

## 2017-09-28 DIAGNOSIS — Z91.14 PATIENT'S OTHER NONCOMPLIANCE WITH MEDICATION REGIMEN: ICD-10-CM

## 2017-09-28 LAB
ALBUMIN SERPL ELPH-MCNC: 4.7 G/DL — SIGNIFICANT CHANGE UP (ref 3.3–5)
ALP SERPL-CCNC: 46 U/L — SIGNIFICANT CHANGE UP (ref 40–120)
ALT FLD-CCNC: 36 U/L — SIGNIFICANT CHANGE UP (ref 4–41)
APTT BLD: 32.3 SEC — SIGNIFICANT CHANGE UP (ref 27.5–37.4)
AST SERPL-CCNC: 23 U/L — SIGNIFICANT CHANGE UP (ref 4–40)
BILIRUB SERPL-MCNC: 0.8 MG/DL — SIGNIFICANT CHANGE UP (ref 0.2–1.2)
BUN SERPL-MCNC: 16 MG/DL — SIGNIFICANT CHANGE UP (ref 7–23)
CALCIUM SERPL-MCNC: 9.5 MG/DL — SIGNIFICANT CHANGE UP (ref 8.4–10.5)
CHLORIDE SERPL-SCNC: 101 MMOL/L — SIGNIFICANT CHANGE UP (ref 98–107)
CO2 SERPL-SCNC: 26 MMOL/L — SIGNIFICANT CHANGE UP (ref 22–31)
CREAT SERPL-MCNC: 1.16 MG/DL — SIGNIFICANT CHANGE UP (ref 0.5–1.3)
GLUCOSE SERPL-MCNC: 80 MG/DL — SIGNIFICANT CHANGE UP (ref 70–99)
HCT VFR BLD CALC: 48.4 % — SIGNIFICANT CHANGE UP (ref 39–50)
HGB BLD-MCNC: 15.7 G/DL — SIGNIFICANT CHANGE UP (ref 13–17)
INR BLD: 1.1 — SIGNIFICANT CHANGE UP (ref 0.88–1.17)
MCHC RBC-ENTMCNC: 28.8 PG — SIGNIFICANT CHANGE UP (ref 27–34)
MCHC RBC-ENTMCNC: 32.4 % — SIGNIFICANT CHANGE UP (ref 32–36)
MCV RBC AUTO: 88.8 FL — SIGNIFICANT CHANGE UP (ref 80–100)
NRBC # FLD: 0 — SIGNIFICANT CHANGE UP
PLATELET # BLD AUTO: 243 K/UL — SIGNIFICANT CHANGE UP (ref 150–400)
PMV BLD: 10.9 FL — SIGNIFICANT CHANGE UP (ref 7–13)
POTASSIUM SERPL-MCNC: 4.5 MMOL/L — SIGNIFICANT CHANGE UP (ref 3.5–5.3)
POTASSIUM SERPL-SCNC: 4.5 MMOL/L — SIGNIFICANT CHANGE UP (ref 3.5–5.3)
PROT SERPL-MCNC: 7.7 G/DL — SIGNIFICANT CHANGE UP (ref 6–8.3)
PROTHROM AB SERPL-ACNC: 12.4 SEC — SIGNIFICANT CHANGE UP (ref 9.8–13.1)
RBC # BLD: 5.45 M/UL — SIGNIFICANT CHANGE UP (ref 4.2–5.8)
RBC # FLD: 13 % — SIGNIFICANT CHANGE UP (ref 10.3–14.5)
SODIUM SERPL-SCNC: 142 MMOL/L — SIGNIFICANT CHANGE UP (ref 135–145)
WBC # BLD: 5.5 K/UL — SIGNIFICANT CHANGE UP (ref 3.8–10.5)
WBC # FLD AUTO: 5.5 K/UL — SIGNIFICANT CHANGE UP (ref 3.8–10.5)

## 2017-09-28 NOTE — H&P PST ADULT - NSANTHOSAYNRD_GEN_A_CORE
No. MYKE screening performed.  STOP BANG Legend: 0-2 = LOW Risk; 3-4 = INTERMEDIATE Risk; 5-8 = HIGH Risk

## 2017-09-28 NOTE — H&P PST ADULT - PSY GEN HX ROS MEA POS PC
anxiety/depression paranoia/depression/Pt reports he his intermittently non-compliant with some of his medications, and has appt with his psychiatrist 10/1/17 to discuss medication regimen/anxiety

## 2017-09-28 NOTE — H&P PST ADULT - PMH
Anxiety    Depression Anal fistula    Anxiety    Chronic hepatitis B  No tx per pt.  Depression    Schizoid personality disorder  paranoia per pt. Denies hallucinations

## 2017-09-28 NOTE — H&P PST ADULT - HISTORY OF PRESENT ILLNESS
36 y/o male with hx of anal fistula with abscess July 2017.  Pt reports he e had fever and was hospitalized x4 days [facility].  Now scheduled for Exam Under anesthesia, Seton 10/16/17. 38 y/o male with hx of anal fistula with abscess July 2017.  Pt reports he had fever and was hospitalized x4 days [facility].  Abscess was drained, and he was tx with IVAB. Now scheduled for Exam Under anesthesia, Kailee 10/16/17.

## 2017-09-28 NOTE — H&P PST ADULT - PSYCHIATRIC COMMENTS
Pt has hx of depression, anxiety, and "schizaprenoid" Pt has hx of depression, anxiety, and "Schizoid disorder", per pt.  Pt denies hallucination

## 2017-09-28 NOTE — H&P PST ADULT - ASSESSMENT
36 y/o male with hx of anal fistula with abscess July 2017.  Pt reports he had fever and was hospitalized x4 days [facility].  Abscess was drained, and he was tx with IVAB. Now scheduled for Exam Under anesthesia, Kailee 10/16/17.

## 2017-09-28 NOTE — H&P PST ADULT - GASTROINTESTINAL COMMENTS
hx of anal fistula with abscess July 2017.  Pt reports he had fever and was hospitalized x4 days [facility].  Abscess was drained, and he was tx with IVAB. Now scheduled for Exam Under anesthesia, Kailee 10/16/17.

## 2017-09-28 NOTE — H&P PST ADULT - NEGATIVE GASTROINTESTINAL SYMPTOMS
no diarrhea/no nausea/no vomiting/no constipation no vomiting/no abdominal pain/no change in bowel habits/no nausea/no diarrhea/no constipation/no melena/no flatulence

## 2017-09-28 NOTE — H&P PST ADULT - PROBLEM SELECTOR PLAN 2
Pt reports intermittent non-compliance with his medication regimen. Pt reports he has appt with Horace Marrero, Psychiatrist, to discuss his medication regimen.

## 2017-10-16 ENCOUNTER — TRANSCRIPTION ENCOUNTER (OUTPATIENT)
Age: 37
End: 2017-10-16

## 2017-10-16 ENCOUNTER — APPOINTMENT (OUTPATIENT)
Dept: COLORECTAL SURGERY | Facility: HOSPITAL | Age: 37
End: 2017-10-16
Payer: COMMERCIAL

## 2017-10-16 ENCOUNTER — OUTPATIENT (OUTPATIENT)
Dept: OUTPATIENT SERVICES | Facility: HOSPITAL | Age: 37
LOS: 1 days | Discharge: HOME | End: 2017-10-16

## 2017-10-16 VITALS
TEMPERATURE: 98 F | WEIGHT: 195.11 LBS | RESPIRATION RATE: 16 BRPM | HEART RATE: 58 BPM | DIASTOLIC BLOOD PRESSURE: 69 MMHG | OXYGEN SATURATION: 100 % | SYSTOLIC BLOOD PRESSURE: 116 MMHG | HEIGHT: 67 IN

## 2017-10-16 VITALS
RESPIRATION RATE: 15 BRPM | TEMPERATURE: 98 F | DIASTOLIC BLOOD PRESSURE: 65 MMHG | SYSTOLIC BLOOD PRESSURE: 108 MMHG | HEART RATE: 67 BPM | OXYGEN SATURATION: 97 %

## 2017-10-16 DIAGNOSIS — K61.1 RECTAL ABSCESS: ICD-10-CM

## 2017-10-16 PROCEDURE — 46270 REMOVE ANAL FIST SUBQ: CPT

## 2017-10-16 PROCEDURE — 46020 PLACEMENT OF SETON: CPT

## 2017-10-16 RX ORDER — OXYCODONE HYDROCHLORIDE 5 MG/1
1 TABLET ORAL
Qty: 20 | Refills: 0 | OUTPATIENT
Start: 2017-10-16

## 2017-10-16 NOTE — ASU DISCHARGE PLAN (ADULT/PEDIATRIC). - SPECIAL INSTRUCTIONS
Please make an appointment to follow up with Dr. Rizzo in 7-10 days after your discharge. You may call their office to make an appointment. Please make an appointment to follow up with Dr. Rizzo in 3 weeks.

## 2017-10-16 NOTE — ASU DISCHARGE PLAN (ADULT/PEDIATRIC). - DIET
progress slowly/start with clear liquids and gradually increase your diet as you can, until you return to your normal diet.

## 2017-10-16 NOTE — ASU DISCHARGE PLAN (ADULT/PEDIATRIC). - MEDICATION SUMMARY - MEDICATIONS TO TAKE
I will START or STAY ON the medications listed below when I get home from the hospital:    oxyCODONE 5 mg oral tablet  -- 1 tab(s) by mouth every 6 hours, As Needed -for moderate pain - for severe pain MDD:4 tabs  -- Caution federal law prohibits the transfer of this drug to any person other  than the person for whom it was prescribed.  It is very important that you take or use this exactly as directed.  Do not skip doses or discontinue unless directed by your doctor.  May cause drowsiness.  Alcohol may intensify this effect.  Use care when operating dangerous machinery.  This prescription cannot be refilled.  Using more of this medication than prescribed may cause serious breathing problems.    -- Indication: For Pain med    amoxicillin-clavulanate 875 mg-125 mg oral tablet  -- 1 tab(s) by mouth 2 times a day MDD:2 tabs  -- Finish all this medication unless otherwise directed by prescriber.  Take with food or milk.    -- Indication: For Home med

## 2017-10-16 NOTE — BRIEF OPERATIVE NOTE - PROCEDURE
<<-----Click on this checkbox to enter Procedure Placement of seton  10/16/2017    Active  DLIA  Fistulotomy, anal  10/16/2017    Active  DLIA  Exam under anesthesia, anorectal  10/16/2017    Active  DLIA

## 2017-10-16 NOTE — ASU DISCHARGE PLAN (ADULT/PEDIATRIC). - INSTRUCTIONS
narcotic pain medicine is constipating,buy over the counter stool softener and take as instructed on the bottle

## 2017-10-16 NOTE — ASU DISCHARGE PLAN (ADULT/PEDIATRIC). - NOTIFY
Pain not relieved by Medications/Inability to Tolerate Liquids or Foods/Unable to Urinate/Fever greater than 101/Bleeding that does not stop/Persistent Nausea and Vomiting

## 2017-12-05 ENCOUNTER — OTHER (OUTPATIENT)
Age: 37
End: 2017-12-05

## 2017-12-05 ENCOUNTER — APPOINTMENT (OUTPATIENT)
Dept: COLORECTAL SURGERY | Facility: CLINIC | Age: 37
End: 2017-12-05
Payer: COMMERCIAL

## 2017-12-05 PROCEDURE — 99024 POSTOP FOLLOW-UP VISIT: CPT

## 2018-01-09 ENCOUNTER — APPOINTMENT (OUTPATIENT)
Dept: COLORECTAL SURGERY | Facility: CLINIC | Age: 38
End: 2018-01-09

## 2018-06-21 ENCOUNTER — APPOINTMENT (OUTPATIENT)
Dept: COLORECTAL SURGERY | Facility: CLINIC | Age: 38
End: 2018-06-21

## 2018-07-03 ENCOUNTER — APPOINTMENT (OUTPATIENT)
Dept: COLORECTAL SURGERY | Facility: CLINIC | Age: 38
End: 2018-07-03
Payer: COMMERCIAL

## 2018-07-03 DIAGNOSIS — K60.3 ANAL FISTULA: ICD-10-CM

## 2018-07-03 DIAGNOSIS — K62.89 OTHER SPECIFIED DISEASES OF ANUS AND RECTUM: ICD-10-CM

## 2018-07-03 PROCEDURE — 99213 OFFICE O/P EST LOW 20 MIN: CPT | Mod: 25

## 2018-07-03 PROCEDURE — 46020 PLACEMENT OF SETON: CPT

## 2018-07-16 PROBLEM — F60.1 SCHIZOID PERSONALITY DISORDER: Chronic | Status: ACTIVE | Noted: 2017-09-28

## 2018-07-31 NOTE — PROCEDURE NOTE - NSDIAGNOSTIC_RESP_A_CORE
ED Oral Problem





- General


Chief Complaint: Toothache


Stated Complaint: TOOTH PAIN


Time Seen by Provider: 07/31/18 22:47


Notes: 


Patient is a 52 year old male that comes to the emergency department for chief 

complaint of pain and swelling to the left side of his jaw and neck.  He states 

that he was evaluated on Monday by dentist Dr. Castillo, placed on clindamycin, 

told he had a fracture but needed to be treated for infection first.  Patient 

has been taking ibuprofen and Tylenol for pain, he states today swelling 

worsened, pain worse and then tonight he could not stand any longer.  He denies 

sore throat, fever.  He denies any other medications on known medical history.





TRAVEL OUTSIDE OF THE [place] IN LAST 30 DAYS: No





- Related Data


Allergies/Adverse Reactions: 


 





Penicillins Allergy (Verified 07/31/18 21:15)


 











Past Medical History





- General


Information source: Patient





- Social History


Smoking Status: Never Smoker


Drug Abuse: None


Lives with: Family


Family History: Reviewed & Not Pertinent





- Medical History


Medical History: Negative


Surgical Hx: Negative





- Immunizations


Hx Diphtheria, Pertussis, Tetanus Vaccination: Yes





Review of Systems





- Review of Systems


Constitutional: No symptoms reported


EENT: See HPI


Cardiovascular: No symptoms reported


Respiratory: No symptoms reported


Gastrointestinal: No symptoms reported


Genitourinary: No symptoms reported


Male Genitourinary: No symptoms reported


Musculoskeletal: No symptoms reported


Skin: No symptoms reported


Hematologic/Lymphatic: No symptoms reported


Neurological/Psychological: No symptoms reported





Physical Exam





- Vital signs


Vitals: 


 











Temp Pulse Resp BP Pulse Ox


 


 98.4 F   80   20   144/97 H  98 


 


 07/31/18 22:19  07/31/18 22:19  07/31/18 22:19  07/31/18 22:19  07/31/18 22:19














- Notes


Notes: 





GENERAL: Alert, interacts well. No acute distress.


HEAD: Normocephalic, atraumatic.


EYES: Pupils equal, round, and reactive to light. Extraocular movements intact.


ENT: There is swelling with a lot of tenderness just under the jawline on the 

left side in the submandibular area, there is lymphadenopathy nearby, no noted 

swelling of the soft tissues under the chin.  Patient has pain with opening his 

mouth, unremarkable oropharyngeal exam.  I do not see any swelling, erythema, 

or tenderness along the gumline or teeth, multiple fillings but no concerning 

oral abnormality.


NECK: Full range of motion. Supple. Trachea midline.


LUNGS: Clear to auscultation bilaterally, no wheezes, rales, or rhonchi. No 

respiratory distress.


HEART: Regular rate and rhythm. No murmur


ABDOMEN: Soft, non-tender. Non-distended. Bowel sounds present in all 4 

quadrants.


EXTREMITIES: Moves all 4 extremities spontaneously. No edema, normal radial and 

dorsalis pedis pulses bilaterally. No cyanosis.


BACK: no cervical, thoracic, lumbar midline tenderness. No saddle anesthesia, 

normal distal neurovascular exam. 


NEUROLOGICAL: Alert and oriented x3. Normal speech. [cranial nerves II through 

XII grossly intact]. 


PSYCH: Normal affect, normal mood.


SKIN: Warm, dry, normal turgor. No rashes or lesions noted.








Course





- Re-evaluation


Re-evalutation: 


Patient has tenderness and swelling at the neck and jawline, no notable dental 

abscess, he does have some adenopathy as well.  Decision was made to proceed 

with CAT scan, this shows swelling of the submandibular gland with adenopathy, 

no noted stone, no abscess, no other abnormality.





Discussed this with patient, provided with pain management, he will continue 

clindamycin, provided him with a copy of his study, discussed treatment 

recommendations, return precautions, and ENT follow-up.  Patient states 

satisfaction and agreement with plan.





- Vital Signs


Vital signs: 


 











Temp Pulse Resp BP Pulse Ox


 


 99.3 F   85   14   127/78 H  97 


 


 08/01/18 02:19  08/01/18 02:19  08/01/18 02:19  08/01/18 02:19  08/01/18 02:19














- Laboratory


Result Diagrams: 


 07/31/18 23:30





 07/31/18 23:30





Discharge





- Discharge


Clinical Impression: 


 Sialadenitis, Lymphadenopathy





Condition: Stable


Disposition: HOME, SELF-CARE


Additional Instructions: 


The facial swelling is actually from a swollen salivary gland called the 

submandibular gland.  I suspect you spit out a stone earlier today.


This can be infected, you also have some swollen lymph nodes, as result I 

recommend that you continue the clindamycin, you have been treated for 

inflammation of the lymph nodes here, take the pain medicine if needed, use 

suckers that will express saliva to help reduce swelling faster such as lemon 

drops.  Follow-up with the ENT referral listed below.  Return for any 

concerning or worsening symptoms including increased swelling, fever of 100.4 

or greater, difficulty swallowing, or any other concerning or worsening 

symptoms.


___________________________





[facility]   


Address: [address] [place] [address] Maryam Miller, [place] [address] 


Phone: [phone]


Prescriptions: 


Hydrocodone/Acetaminophen [Norco 5-325 mg Tablet] 1 - 2 tab PO ASDIR #10 tablet


Forms:  Return to Work


Referrals: 


WILLIAMS,LENNOX, MD [Primary Care Provider] - Follow up as needed Therapeutic

## 2018-09-01 PROBLEM — B18.1 CHRONIC VIRAL HEPATITIS B WITHOUT DELTA-AGENT: Chronic | Status: ACTIVE | Noted: 2017-09-28

## 2018-09-01 PROBLEM — K60.3 ANAL FISTULA: Chronic | Status: ACTIVE | Noted: 2017-09-28

## 2018-09-05 ENCOUNTER — OUTPATIENT (OUTPATIENT)
Dept: OUTPATIENT SERVICES | Facility: HOSPITAL | Age: 38
LOS: 1 days | End: 2018-09-05

## 2018-09-05 VITALS
WEIGHT: 212.08 LBS | OXYGEN SATURATION: 99 % | SYSTOLIC BLOOD PRESSURE: 120 MMHG | DIASTOLIC BLOOD PRESSURE: 80 MMHG | HEIGHT: 68 IN | RESPIRATION RATE: 16 BRPM | TEMPERATURE: 98 F | HEART RATE: 57 BPM

## 2018-09-05 DIAGNOSIS — F41.9 ANXIETY DISORDER, UNSPECIFIED: ICD-10-CM

## 2018-09-05 DIAGNOSIS — K60.3 ANAL FISTULA: ICD-10-CM

## 2018-09-05 DIAGNOSIS — F32.9 MAJOR DEPRESSIVE DISORDER, SINGLE EPISODE, UNSPECIFIED: ICD-10-CM

## 2018-09-05 LAB
ALBUMIN SERPL ELPH-MCNC: 4.6 G/DL — SIGNIFICANT CHANGE UP (ref 3.3–5)
ALP SERPL-CCNC: 48 U/L — SIGNIFICANT CHANGE UP (ref 40–120)
ALT FLD-CCNC: 26 U/L — SIGNIFICANT CHANGE UP (ref 4–41)
AST SERPL-CCNC: 22 U/L — SIGNIFICANT CHANGE UP (ref 4–40)
BILIRUB SERPL-MCNC: 0.7 MG/DL — SIGNIFICANT CHANGE UP (ref 0.2–1.2)
BUN SERPL-MCNC: 17 MG/DL — SIGNIFICANT CHANGE UP (ref 7–23)
CALCIUM SERPL-MCNC: 9.8 MG/DL — SIGNIFICANT CHANGE UP (ref 8.4–10.5)
CHLORIDE SERPL-SCNC: 97 MMOL/L — LOW (ref 98–107)
CO2 SERPL-SCNC: 27 MMOL/L — SIGNIFICANT CHANGE UP (ref 22–31)
CREAT SERPL-MCNC: 1.19 MG/DL — SIGNIFICANT CHANGE UP (ref 0.5–1.3)
GLUCOSE SERPL-MCNC: 80 MG/DL — SIGNIFICANT CHANGE UP (ref 70–99)
HCT VFR BLD CALC: 44 % — SIGNIFICANT CHANGE UP (ref 39–50)
HGB BLD-MCNC: 15 G/DL — SIGNIFICANT CHANGE UP (ref 13–17)
MCHC RBC-ENTMCNC: 29 PG — SIGNIFICANT CHANGE UP (ref 27–34)
MCHC RBC-ENTMCNC: 34.1 % — SIGNIFICANT CHANGE UP (ref 32–36)
MCV RBC AUTO: 85.1 FL — SIGNIFICANT CHANGE UP (ref 80–100)
NRBC # FLD: 0 — SIGNIFICANT CHANGE UP
PLATELET # BLD AUTO: 240 K/UL — SIGNIFICANT CHANGE UP (ref 150–400)
PMV BLD: 9.8 FL — SIGNIFICANT CHANGE UP (ref 7–13)
POTASSIUM SERPL-MCNC: 4.3 MMOL/L — SIGNIFICANT CHANGE UP (ref 3.5–5.3)
POTASSIUM SERPL-SCNC: 4.3 MMOL/L — SIGNIFICANT CHANGE UP (ref 3.5–5.3)
PROT SERPL-MCNC: 7.7 G/DL — SIGNIFICANT CHANGE UP (ref 6–8.3)
RBC # BLD: 5.17 M/UL — SIGNIFICANT CHANGE UP (ref 4.2–5.8)
RBC # FLD: 12.9 % — SIGNIFICANT CHANGE UP (ref 10.3–14.5)
SODIUM SERPL-SCNC: 137 MMOL/L — SIGNIFICANT CHANGE UP (ref 135–145)
WBC # BLD: 7.09 K/UL — SIGNIFICANT CHANGE UP (ref 3.8–10.5)
WBC # FLD AUTO: 7.09 K/UL — SIGNIFICANT CHANGE UP (ref 3.8–10.5)

## 2018-09-05 NOTE — H&P PST ADULT - PMH
Anal fistula    Anxiety    Chronic hepatitis B  No tx per pt.  Depression    Schizoid personality disorder  paranoia per pt. Denies hallucinations

## 2018-09-05 NOTE — H&P PST ADULT - HISTORY OF PRESENT ILLNESS
39 y/o male presents to [facility] for preoperative evaluation with dx of anal fistula. h/o anal fistula for >1 year. s/p exam under anesthesia, seton placement October 2017. Pt reports 2 months ago his seton fell out. A new one was replaced and patient was scheduled for surgery. Scheduled for Rectal Examination Under Anesthesia Fistulotomy on 9/17/2018.

## 2018-09-05 NOTE — H&P PST ADULT - PROBLEM SELECTOR PLAN 1
Scheduled for Rectal Examination Under Anesthesia Fistulotomy on 9/17/2018.  Preop instructions given, pt verbalized understanding   GI prophylaxis provided

## 2018-09-05 NOTE — H&P PST ADULT - NEGATIVE GASTROINTESTINAL SYMPTOMS
no change in bowel habits/no constipation/no abdominal pain/no diarrhea/no flatulence/no melena/no nausea/no vomiting

## 2018-09-16 ENCOUNTER — TRANSCRIPTION ENCOUNTER (OUTPATIENT)
Age: 38
End: 2018-09-16

## 2018-09-17 ENCOUNTER — OUTPATIENT (OUTPATIENT)
Dept: OUTPATIENT SERVICES | Facility: HOSPITAL | Age: 38
LOS: 1 days | Discharge: HOME | End: 2018-09-17
Payer: COMMERCIAL

## 2018-09-17 ENCOUNTER — APPOINTMENT (OUTPATIENT)
Dept: COLORECTAL SURGERY | Facility: HOSPITAL | Age: 38
End: 2018-09-17

## 2018-09-17 VITALS
RESPIRATION RATE: 16 BRPM | OXYGEN SATURATION: 98 % | HEART RATE: 62 BPM | DIASTOLIC BLOOD PRESSURE: 62 MMHG | SYSTOLIC BLOOD PRESSURE: 110 MMHG | TEMPERATURE: 98 F

## 2018-09-17 VITALS
TEMPERATURE: 97 F | WEIGHT: 212.08 LBS | HEIGHT: 68 IN | RESPIRATION RATE: 16 BRPM | OXYGEN SATURATION: 98 % | SYSTOLIC BLOOD PRESSURE: 120 MMHG | HEART RATE: 58 BPM | DIASTOLIC BLOOD PRESSURE: 80 MMHG

## 2018-09-17 DIAGNOSIS — K60.3 ANAL FISTULA: ICD-10-CM

## 2018-09-17 PROCEDURE — 46275 REMOVE ANAL FIST INTER: CPT

## 2018-09-17 PROCEDURE — 46030 REMOVAL ANAL SETON OTH MRK: CPT

## 2018-09-17 RX ORDER — OXYCODONE HYDROCHLORIDE 5 MG/1
1 TABLET ORAL
Qty: 10 | Refills: 0 | OUTPATIENT
Start: 2018-09-17

## 2018-09-17 NOTE — ASU DISCHARGE PLAN (ADULT/PEDIATRIC). - NOTIFY
Persistent Nausea and Vomiting/Fever greater than 101/Pain not relieved by Medications/Inability to Tolerate Liquids or Foods/Bleeding that does not stop Persistent Nausea and Vomiting/Pain not relieved by Medications/Inability to Tolerate Liquids or Foods/Unable to Urinate/Fever greater than 101/Bleeding that does not stop

## 2018-09-17 NOTE — ASU DISCHARGE PLAN (ADULT/PEDIATRIC). - MEDICATION SUMMARY - MEDICATIONS TO TAKE
I will START or STAY ON the medications listed below when I get home from the hospital:    see medication reconciliation sheet  -- Indication: For home medications    oxyCODONE 5 mg oral tablet  -- 1 tab(s) by mouth every 4 hours, As Needed -for severe pain MDD:6 tabs   -- Caution federal law prohibits the transfer of this drug to any person other  than the person for whom it was prescribed.  It is very important that you take or use this exactly as directed.  Do not skip doses or discontinue unless directed by your doctor.  May cause drowsiness.  Alcohol may intensify this effect.  Use care when operating dangerous machinery.  This prescription cannot be refilled.  Using more of this medication than prescribed may cause serious breathing problems.    -- Indication: For severe pain

## 2018-09-17 NOTE — ASU DISCHARGE PLAN (ADULT/PEDIATRIC). - SPECIAL INSTRUCTIONS
WOUND CARE: Please keep incisions clean and dry. Please do not Scrub or rub incisions. Do not use lotion or powder on incisions. all rectal dressing to come out on its own during your next BM.  BATHING: Please do not submerge wound underwater. You may shower and/or sponge bathe.  ACTIVITY: No heavy lifting or straining until your follow up appointment. Otherwise, you may return to your usual level of physical activity. If you are taking narcotic pain medication (such as Oxycodone) DO NOT drive a car, operate machinery or make important decisions.  DIET: Return to your usual diet.  NOTIFY YOUR SURGEON IF: You have any bleeding that does not stop, any pus draining from your wound(s), any fever (over 100.4 F) or chills, persistent nausea/vomiting, persistent diarrhea, or if your pain is not controlled on your discharge pain medications.  FOLLOW-UP: Please follow-up with your surgeon within 3 weeks following discharge-please call to schedule an appointment.   PAIN MEDICATION: Alternate taking 500 mg acetaminophen and 400 mg ibuprofen every 3 hours. you should be taking 500 mg acetaminophen every 6 hours total and 400 mg ibuprofen every 6 hours total. you may take the prescribed oxycodone every 4 hours as needed for severe pain not relieved by acetaminophen and ibuprofen.

## 2018-09-17 NOTE — BRIEF OPERATIVE NOTE - PROCEDURE
<<-----Click on this checkbox to enter Procedure Anal fistulotomy  09/17/2018  removal of seton and fistulotomy  Active  RINGBER

## 2018-10-09 ENCOUNTER — APPOINTMENT (OUTPATIENT)
Dept: COLORECTAL SURGERY | Facility: CLINIC | Age: 38
End: 2018-10-09

## 2018-10-24 NOTE — H&P PST ADULT - VENOUS THROMBOEMBOLISM BMI
This nurse attempted to draw labs. Unsuccessful. Lab contacted.       Shama Rayo RN  10/24/18 [id number] (obesity)

## 2019-01-29 NOTE — ED ADULT NURSE NOTE - NS ED NOTE  TALK SOMEONE YN
Pre-Chart Review                                                                              Recent labs:  PRX (DIABETES & CHOLESTEROL & GFR & PSA)  Lab Results   Component Value Date/Time    GLUCOSE 122 (H) 2011 02:15 PM    CHOLESTEROL 192 2019 12:32 PM    CALCLDL 106 2019 12:32 PM    HDL 68 2019 12:32 PM    TRIGLYCERIDE 88 2019 12:32 PM    GFRNA >60 2011 02:15 PM    GFRA >60 2011 02:15 PM     BMP/LFT/CBC  Lab Results   Component Value Date/Time    SODIUM 141 2011 02:15 PM    POTASSIUM 4.2 2011 02:15 PM    CALCIUM 9.1 2011 02:15 PM    CREATININE 0.80 2011 02:15 PM    BUN 16 2011 02:15 PM    WBC 8.3 2017 02:21 PM    HCT 37.4 03/15/2018 03:13 PM    HGB 13.4 2017 02:21 PM    MCV 90.7 2017 02:21 PM     2017 02:21 PM     2011 11:43 AM     ENDOCRINAL  Lab Results   Component Value Date/Time    HGBA1C 5.6 2019 12:32 PM    VITD25 41.4 2011 02:15 PM    TSH 0.223 (L) 2019 12:32 PM    T4FREE 0.9 2019 12:32 PM                                                                                                                  PRx: Pap (15; d18). Henry (d) >>> Pap. Henry.  Sum (D/Z5T7-43/Qsmo-20/Unem)  - (PSY = Prevea) Bip/Anx/Dep. = Pro, Klon. >>> CMP (obe/hm)  - SHypoT. >>> TSH.  - GDM.  - Lactose.  - CBP.   - Cb/LHip/PelvicP (XR hip, osteitits pubis) >>> f/u.    SUBJECTIVE: Yasmine Pickett is a 43 year old female who presented for evaluation of the following complaints.    1. Ostitis pubis. Left elbow pain. Abnormal thyroid hormones.    -Patient is here for recheck. Patient was prescribed with meloxicam and Prilosec for the management of ostitis pubis. Patient states that meloxicam helped her alleviate the symptoms, however she developed adverse effect including dyspepsia. So, patient ended up discontinuing the medication. Patient also was noted to have abnormal thyroid hormones. Denies any  weight changes appetite changes dry skin.    REVIEW OF SYSTEMS: As mentioned above.     MEDICATIONS:  Current Outpatient Medications   Medication Sig Dispense Refill   • omeprazole (PRILOSEC) 40 MG capsule Take 1 capsule by mouth daily. 30 capsule 1   • cyclobenzaprine (FLEXERIL) 5 MG tablet Take 1 tablet by mouth nightly as needed for Muscle spasms. 30 tablet 0   • FLUOXETINE HCL PO Take 40 mg by mouth.      • clonazePAM (KLONOPIN) 1 MG tablet Take 1 tablet by mouth 2 times daily as needed for Anxiety. 8 tablet 0   • meloxicam (MOBIC) 15 MG tablet Take 1 tablet by mouth daily. 30 tablet 1   • ibuprofen (MOTRIN) 200 MG tablet Take 200 mg by mouth every 6 hours as needed for Pain.       No current facility-administered medications for this visit.        ALLERGIES:   Allergies as of 01/31/2019 - Reviewed 01/31/2019   Allergen Reaction Noted   • Tetracycline SWELLING 11/29/2013   • Lactose intolerance   (food or med) GI UPSET 05/18/2016       PAST HISTORIES: I have reviewed with the patient about the past medical history, medications, allergies and social history listed in the medical record as well as the nursing notes for this encounter.    OBJECTIVE:    PHYSICAL EXAM:  Vital Signs:    Visit Vitals  /68   Pulse 76   Ht 5' 7\" (1.702 m)   Wt 87.5 kg   LMP 01/26/2019   BMI 30.23 kg/m²     General: Well-developed, well-nourished, well-attired 43 year old female in no acute distress. Well-appearing.      LABS: Reviewed    IMAGING: Reviewed    ASSESSMENT: This is a 43 year old female presenting for:    1. Abnormal thyroid blood test    2. Left elbow pain    3. Osteitis pubis (CMS/HCC)      PLAN:     Recommend continuing the same medication regimen and management plan for medical conditions discussed today unless otherwise specified below.   Please see Smartchart activity/medication for details.     Abnormal thyroid blood test  - THYROID STIMULATING HORMONE; Future  - FREE T4; Future    Left elbow pain (left lateral  epicondylitis)  - SERVICE TO PHYSICAL THERAPY    Osteitis pubis (CMS/HCC)  - SERVICE TO PHYSICAL THERAPY  - a trial of herbal medicine (Tumeric) and physical therapy.  - if not improving, then will refer to orthopedic for further evaluation.    There are no discontinued medications.    - Preventive health:   Recommended age-appropriate screening testing. Advised her to follow up with her OB/GYN    Instructions: Verbal instructions and/or written instructions provided. The patient verbalized understanding of my management plan and agreed with it.    Follow-up: Return in about 3 months (around 4/30/2019), or if symptoms worsen or fail to improve, for Recheck, Labs or Image today.      __minutes of medical care provided, of which more than 50% of a visit spent for reviewing medical record, counseling and coordinating care.       No

## 2019-02-20 NOTE — H&P ADULT - NSCORESITESY/N_GEN_A_CORE_RD
No no repetitive motions, do not lift arms overhead, and no heavy lifting over 20lbs/no heavy lifting

## 2020-12-18 NOTE — H&P PST ADULT - RECTAL EXAM
Received a phone call from Dr. Brenda Ramsey, patient was to be admitted to tele. Transfer order placed, and bed coordinator was notified. anal fistula per dx

## 2021-06-28 NOTE — H&P PST ADULT - COMFORT LEVEL, ACCEPTABLE
Group Therapy Note    Date: 6/28/2021    Group Start Time: 0900  Group End Time: 0920  Group Topic: Community Meeting    [address] [place] [address], [address] [place] [address] Therapy Note    Attendees: 8/17    patient refused to attend community meeting and goal setting group at [phone] after encouragement from staff. 1:1 talk time provided as alternative to group session.        Signature:  Moon Ojeda, [address]  8

## 2021-11-22 NOTE — ASU PREOP CHECKLIST - WARM FLUIDS/WARM BLANKETS
No chief complaint on file. Impression and Plan:  78 y.o. male with leg pain. His previous arterial studies in 2018  have been normal and the CTA in august of this year  Indicated moderate atherosclerosis. His pain while he does have claudication is most likely neurogenic. Pt advised to return if he notes skin changes in his feet or wounds as he does have some small vessel disease. History and Physical    Alan Barros is a 78y.o. year old male hx of COPD, DVT/PE on chronic anticoagulation with Eliquis,  hypertension, bladder outlet obstruction  here  for review  of his KAVITA for complaints of bilateral lower exetrmity pain right worse than left. The patient states that he has burning, pins-and-needles along his legs all the time and that his leg pain increases in severity when he ambulates. He states this has been going on for quite some time now. His 11/17/2021 KAVITA indicates normal perfusion at rest with mild to moderate arterial disease. His KAVITA 0.93 on the right and 0.97 on the left. His DBI is suggestive of small vessel disease. His CTA of the chest abdomen and pelvis 8/2/2021 which indicated no significant vascular abnormality. There are patent bilateral internal and external iliac arteries and moderate atherosclerotic disease. His feet are warm and well-perfused. He does have a palpable pedal pulse on the right. The right leg is his worst leg. He states that it is burning the whole entire time he has been sitting. The patient does have a history of a spinal injury in the 1970s. His MRI indicates mild wide-based disc bulge L3-L5. He had a slew of tests during this time for DVT and PE. He is currently anticoagulated. Statins cause muscle pain. He uses Ze[facility] for his HLD.        Past Medical History:   Diagnosis Date    Bladder outlet obstruction     Erectile disorder due to medical condition in male patient     Frequency of urination     H/O spinal cord injury 1974 lumbar spine injury secondary to fall    HTN (hypertension)     Hypercholesterolemia     Illiterate     Injury of lumbar spine ([facility] 1974    Leg pain, right     Nocturia     Overactive bladder     Peripheral vascular disease ([facility]      Patient Active Problem List   Diagnosis Code    Unsteady gait R26.81    Gait instability R26.81    Vertigo R42    Radiculopathy of lumbar region M54.16    ED (erectile dysfunction) of organic origin N52.9    Peripheral vascular disease (HCC) I73.9    Overactive bladder N32.81    Nocturia R35.1    Leg pain, right M79.604    Hypercholesterolemia E78.00    HTN (hypertension) I10    H/O spinal cord injury Z87.828    Erectile disorder due to medical condition in male patient N52.1    Bladder outlet obstruction N32.0    Injury of lumbar spine ([facility] S34.109A    Frequency of urination R35.0    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    History of rheumatic fever Z86.79    Chest pain, moderate coronary artery risk R07.9    CAD (coronary artery disease) I25.10    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    Cubital tunnel syndrome, left G56.22    Left carpal tunnel syndrome G56.02    Acute pulmonary embolism (HCC) I26.99    Severe protein-calorie malnutrition (HCC) E43    Hemoptysis R04.2    Acute deep vein thrombosis (DVT) of proximal vein of both lower extremities ([facility]) I82.4Y3     Past Surgical History:   Procedure Laterality Date    COLONOSCOPY N/A 12/4/2019    COLONOSCOPY performed by [facility] Humph[facility] at [facility] ENDOSCOPY    HAND/FINGER SURGERY UNLISTED Right     carpal tunnel    HX HEENT Left     lens implant    HX ORTHOPAEDIC      finger amputation left hand    HX TONSILLECTOMY      UPPER ARM/ELBOW SURGERY UNLISTED Right      Current Outpatient Medications   Medication Sig Dispense Refill    wheat dextrin (Benefiber Healthy Shape) 5 gram/7.4 gram powd Take  by mouth.  metoprolol succinate (TOPROL-XL) 50 mg XL tablet Take 1 Tablet by mouth daily.  90 Tablet 3  apixaban (Eliquis) 5 mg tablet Take 1 Tablet by mouth two (2) times a day. 60 Tablet 5    famotidine (PEPCID) 20 mg tablet Take 20 mg by mouth two (2) times a day.  ezetimibe (ZETIA) 10 mg tablet Take 10 mg by mouth daily.  spironolactone (Aldactone) 25 mg tablet Take 25 mg by mouth daily.  tamsulosin (FLOMAX) 0.4 mg capsule Take 2 Capsules by mouth daily (after dinner). 180 Capsule 3    finasteride (PROSCAR) 5 mg tablet Take 1 Tablet by mouth daily. 90 Tablet 3    polyethylene glycol (MIRALAX) 17 gram packet Take 1 Packet by mouth daily. 30 Packet 0    diclofenac (VOLTAREN) 1 % gel Apply 2 g to affected area four (4) times daily. 100 g 2    DULoxetine (CYMBALTA) 60 mg capsule Take 1 Cap by mouth daily. Indications: neuropathic pain 60 Cap 5    albuterol sulfate 90 mcg/actuation aebs Take  by inhalation as needed.  pantoprazole (PROTONIX) 40 mg tablet Take 1 Tab by mouth daily. 30 Tab 0    amLODIPine (NORVASC) 10 mg tablet Take 1 Tab by mouth daily.  30 Tab 0     Allergies   Allergen Reactions    Latex Rash     Other reaction(s): Unknown    Ampicillin Angioedema    Asa-Acetaminophen-Caff-Buffers Rash    Penicillins Angioedema     Other reaction(s): anaphylaxis    Crab Hives    Shellfish Derived Rash    Aspirin Other (comments)     Stomach upset  Other reaction(s): Unknown    Atorvastatin Other (comments)     Muscle cramps     Social History     Socioeconomic History    Marital status:      Spouse name: Not on file    Number of children: Not on file    Years of education: Not on file    Highest education level: Not on file   Occupational History    Not on file   Tobacco Use    Smoking status: Former Smoker     Quit date: 2015     Years since quittin.3    Smokeless tobacco: Never Used   Vaping Use    Vaping Use: Never used   Substance and Sexual Activity    Alcohol use: Not Currently     Alcohol/week: 0.0 standard drinks     Comment: former stopped   Drug use: No    Sexual activity: Yes   Other Topics Concern    Not on file   Social History Narrative    Not on file     Social Determinants of Health     Financial Resource Strain:     Difficulty of Paying Living Expenses: Not on file   Food Insecurity:     Worried About Running Out of Food in the Last Year: Not on file    Keira of Food in the Last Year: Not on file   Transportation Needs:     Lack of Transportation (Medical): Not on file    Lack of Transportation (Non-Medical):  Not on file   Physical Activity:     Days of Exercise per Week: Not on file    Minutes of Exercise per Session: Not on file   Stress:     Feeling of Stress : Not on file   Social Connections:     Frequency of Communication with Friends and Family: Not on file    Frequency of Social Gatherings with Friends and Family: Not on file    Attends Sikh Services: Not on file    Active Member of [address] [place] [address] [employer] or Organizations: Not on file    Attends Club or Organization Meetings: Not on file    Marital Status: Not on file   Intimate Partner Violence:     Fear of Current or Ex-Partner: Not on file    Emotionally Abused: Not on file    Physically Abused: Not on file    Sexually Abused: Not on file   Housing Stability:     Unable to Pay for Housing in the Last Year: Not on file    Number of [place] in the Last Year: Not on file    Unstable Housing in the Last Year: Not on file      Family History   Problem Relation Age of Onset    Diabetes Mother     Hypertension Mother     Diabetes Maternal Grandmother     Hypertension Maternal Grandmother     Cancer Sister         brain    Cancer Sister         brain       Review of Systems    General: negative for fever   Eyes: negative for vision loss   HENT: negative for cold symptoms   Respiratory negative for shortness of breath   Cardiac: negative for chest pain   Vascular negative for foot pain at night    Gastrointestinal: negative for abdominal pain   Genitourinary: negative for dysuria    Endocrine: negative for excessive thirst   Skin: negative for rash   Neurological: negative for paralysis   Psychiatric: negative for depression          Physical Exam:    There were no vitals taken for this visit. Constitutional:  Patient is well developed, well nourished, and not distressed. HEENT: atraumatic, normocephalic, wearing a mask. Eyes:   Cunjunctivae clear, no scleral icterus  Neck:   No JVD present. Cardiovascular:  Normal rate, regular rhythm, normal heart sounds. No murmur heard. Pulmonary/Chest: Effort normal .  Extremities: Normal range of motion. Neurological:  he  is alert and oriented x3 . Gait normal. Motor & sensory grossly intact in all 4 limbs. Psych: Appropriate mood and affect. Skin:  Skin is warm and dry. No ulcerations        The treatment plan was reviewed with the patient in detail. The patient voiced understanding of this plan and all questions and concerns were addressed. The patient agrees with this plan. We discussed the signs and symptoms that would require earlier attention or intervention. I appreciate the opportunity to participate in the care of your patient. I will be sure to keep you informed of any subsequent changes in the treatment plan. If you have any questions or concerns, please feel free to contact me.       Shai Adame [facility]  Vascular Nurse [address]  [phone] no

## 2022-06-15 ENCOUNTER — EMERGENCY (EMERGENCY)
Facility: HOSPITAL | Age: 42
LOS: 1 days | Discharge: HOME | End: 2022-06-15
Attending: EMERGENCY MEDICINE | Admitting: EMERGENCY MEDICINE
Payer: COMMERCIAL

## 2022-06-15 VITALS
OXYGEN SATURATION: 98 % | SYSTOLIC BLOOD PRESSURE: 113 MMHG | HEART RATE: 85 BPM | DIASTOLIC BLOOD PRESSURE: 62 MMHG | HEIGHT: 68 IN | RESPIRATION RATE: 18 BRPM | TEMPERATURE: 98 F

## 2022-06-15 LAB
ALBUMIN SERPL ELPH-MCNC: 4.8 G/DL — SIGNIFICANT CHANGE UP (ref 3.3–5)
ALP SERPL-CCNC: 55 U/L — SIGNIFICANT CHANGE UP (ref 40–120)
ALT FLD-CCNC: 26 U/L — SIGNIFICANT CHANGE UP (ref 4–41)
ANION GAP SERPL CALC-SCNC: 6 MMOL/L — LOW (ref 7–14)
AST SERPL-CCNC: 24 U/L — SIGNIFICANT CHANGE UP (ref 4–40)
BASOPHILS # BLD AUTO: 0.08 K/UL — SIGNIFICANT CHANGE UP (ref 0–0.2)
BASOPHILS NFR BLD AUTO: 1.3 % — SIGNIFICANT CHANGE UP (ref 0–2)
BILIRUB SERPL-MCNC: 0.4 MG/DL — SIGNIFICANT CHANGE UP (ref 0.2–1.2)
BUN SERPL-MCNC: 14 MG/DL — SIGNIFICANT CHANGE UP (ref 7–23)
CALCIUM SERPL-MCNC: 9.8 MG/DL — SIGNIFICANT CHANGE UP (ref 8.4–10.5)
CHLORIDE SERPL-SCNC: 102 MMOL/L — SIGNIFICANT CHANGE UP (ref 98–107)
CO2 SERPL-SCNC: 29 MMOL/L — SIGNIFICANT CHANGE UP (ref 22–31)
CREAT SERPL-MCNC: 1.01 MG/DL — SIGNIFICANT CHANGE UP (ref 0.5–1.3)
EGFR: 97 ML/MIN/1.73M2 — SIGNIFICANT CHANGE UP
EOSINOPHIL # BLD AUTO: 0.42 K/UL — SIGNIFICANT CHANGE UP (ref 0–0.5)
EOSINOPHIL NFR BLD AUTO: 7 % — HIGH (ref 0–6)
GLUCOSE SERPL-MCNC: 90 MG/DL — SIGNIFICANT CHANGE UP (ref 70–99)
HCT VFR BLD CALC: 46.3 % — SIGNIFICANT CHANGE UP (ref 39–50)
HGB BLD-MCNC: 15.5 G/DL — SIGNIFICANT CHANGE UP (ref 13–17)
HIV 1+2 AB+HIV1 P24 AG SERPL QL IA: SIGNIFICANT CHANGE UP
IANC: 3.22 K/UL — SIGNIFICANT CHANGE UP (ref 1.8–7.4)
IMM GRANULOCYTES NFR BLD AUTO: 0.3 % — SIGNIFICANT CHANGE UP (ref 0–1.5)
LYMPHOCYTES # BLD AUTO: 1.67 K/UL — SIGNIFICANT CHANGE UP (ref 1–3.3)
LYMPHOCYTES # BLD AUTO: 27.9 % — SIGNIFICANT CHANGE UP (ref 13–44)
MCHC RBC-ENTMCNC: 28.3 PG — SIGNIFICANT CHANGE UP (ref 27–34)
MCHC RBC-ENTMCNC: 33.5 GM/DL — SIGNIFICANT CHANGE UP (ref 32–36)
MCV RBC AUTO: 84.5 FL — SIGNIFICANT CHANGE UP (ref 80–100)
MONOCYTES # BLD AUTO: 0.58 K/UL — SIGNIFICANT CHANGE UP (ref 0–0.9)
MONOCYTES NFR BLD AUTO: 9.7 % — SIGNIFICANT CHANGE UP (ref 2–14)
NEUTROPHILS # BLD AUTO: 3.22 K/UL — SIGNIFICANT CHANGE UP (ref 1.8–7.4)
NEUTROPHILS NFR BLD AUTO: 53.8 % — SIGNIFICANT CHANGE UP (ref 43–77)
NRBC # BLD: 0 /100 WBCS — SIGNIFICANT CHANGE UP
NRBC # FLD: 0 K/UL — SIGNIFICANT CHANGE UP
PLATELET # BLD AUTO: 262 K/UL — SIGNIFICANT CHANGE UP (ref 150–400)
POTASSIUM SERPL-MCNC: 4.3 MMOL/L — SIGNIFICANT CHANGE UP (ref 3.5–5.3)
POTASSIUM SERPL-SCNC: 4.3 MMOL/L — SIGNIFICANT CHANGE UP (ref 3.5–5.3)
PROT SERPL-MCNC: 7.7 G/DL — SIGNIFICANT CHANGE UP (ref 6–8.3)
RBC # BLD: 5.48 M/UL — SIGNIFICANT CHANGE UP (ref 4.2–5.8)
RBC # FLD: 12.6 % — SIGNIFICANT CHANGE UP (ref 10.3–14.5)
SODIUM SERPL-SCNC: 137 MMOL/L — SIGNIFICANT CHANGE UP (ref 135–145)
T PALLIDUM AB TITR SER: NEGATIVE — SIGNIFICANT CHANGE UP
TSH SERPL-MCNC: 1.83 UIU/ML — SIGNIFICANT CHANGE UP (ref 0.27–4.2)
WBC # BLD: 5.99 K/UL — SIGNIFICANT CHANGE UP (ref 3.8–10.5)
WBC # FLD AUTO: 5.99 K/UL — SIGNIFICANT CHANGE UP (ref 3.8–10.5)

## 2022-06-15 PROCEDURE — 99284 EMERGENCY DEPT VISIT MOD MDM: CPT

## 2022-06-15 NOTE — ED ADULT TRIAGE NOTE - BP NONINVASIVE DIASTOLIC (MM HG)
NST-R.    Patient reports positive fetal movement and she reports occasional random mild contractions. She denies vaginal bleeding, leaking of fluid from the vagina, headaches/visual changes.    Warning signs and symptoms were reviewed with the patient and the patient verbalized understanding.    RTC in 4  days for GBS and routine prenatal care.    Alonso Don CNM    
Patient presents for an OB visit with NST.    Patient would like communication of their results via:        Cell Phone:   Telephone Information:   Mobile [phone]     Okay to leave a message containing results? Yes  Patient's current [facility] status: Active.     
62

## 2022-06-15 NOTE — ED PROVIDER NOTE - NS ED ATTENDING STATEMENT MOD
This was a shared visit with the SANJEEV. I reviewed and verified the documentation and independently performed the documented:

## 2022-06-15 NOTE — ED PROVIDER NOTE - CLINICAL SUMMARY MEDICAL DECISION MAKING FREE TEXT BOX
40 y/o male c/o rash x 10 days  -unclear etiology, possible pityriasis vs viral, r/o other causes, less likely allergic  -labs hiv tsh syphillis  -outpt derm follow up

## 2022-06-15 NOTE — ED PROVIDER NOTE - OBJECTIVE STATEMENT
40 y/o male hx schizoaffective disease presents to ER  c/o rash x 10 days. Pt. states roughly 10 days ago developed rash to right upper extremity which has since spread to chest torso abdomen and legs as well. Pt. states rash is puritic in nature and worse at night. Has been taking claritin and benadryl with minimal releif. Pt. went to his PMD this weekend and was told possible pityriasis rosea. Pt. denies fever chills weakness dizziness nausea vomit. Denies new products

## 2022-06-15 NOTE — ED ADULT TRIAGE NOTE - CHIEF COMPLAINT QUOTE
p/t c/o of rash all over the body for 10 days, lower abd discomfort and occasional dysuria, denies sick contact,

## 2022-06-15 NOTE — ED PROVIDER NOTE - PHYSICAL EXAMINATION
Gen: Well appearing in NAD  Head: NC/AT  Neck: trachea midline  Resp:  No distress  Ext: no deformities  Neuro:  A&O appears non focal  Skin:  Warm and dry as visualized  Psych:  Normal affect and mood     skin: + maculopapular rash to extremities/torso abdomen - erythematous macular lesions with central scabbing. no vesicles no bleeding

## 2022-06-15 NOTE — ED PROVIDER NOTE - PATIENT PORTAL LINK FT
You can access the FollowMyHealth Patient Portal offered by [facility] by registering at the following website: [email or web address]. By joining [facility]’s FollowMyHealth portal, you will also be able to view your health information using other applications (apps) compatible with our system.

## 2022-06-15 NOTE — ED PROVIDER NOTE - ATTENDING APP SHARED VISIT CONTRIBUTION OF CARE
I performed a face-to-face evaluation of the patient and performed a history and physical examination. I agree with the history and physical examination. If this was a PA visit, I personally saw the patient with the PA and performed a substantive portion of the visit including all aspects of the medical decision making.    Diffuse, flat, red rash w/ some lesions having scaling. Not particularly itchy. Began after re-starting Lexapro. No STD risks. Check TSH, RPR, HIV. Refer to Derm.

## 2022-06-15 NOTE — ED PROVIDER NOTE - NSFOLLOWUPINSTRUCTIONS_ED_ALL_ED_FT
[facility]  Dermatology  [address]  [place] [address]  Phone: [phone]  Fax: [phone]    [facility]  Dermatology  [address]  [place] [address]  Phone: [phone]  Fax: [phone]    [facility]  Dermatology  [address]  [place] [address]  Phone: [phone]  Fax:   Follow Up Time:     [facility]  Dermatology  [address] [place] [address]  Phone: [phone]  Fax: [phone]  Follow Up Time:     Rash    A rash is a change in the color of the skin. A rash can also change the way your skin feels. There are many different conditions and factors that can cause a rash.     Follow these instructions at home:  Pay attention to any changes in your symptoms. Follow these instructions to help with your condition:    Medicine    Take or apply over-the-counter and prescription medicines only as told by your health care provider. These may include:    Corticosteroid cream.  Anti-itch lotions.  Oral antihistamines.    Skin Care    Apply cool compresses to the affected areas.  Try taking a bath with:  Epsom salts. Follow the instructions on the packaging. You can get these at your local pharmacy or grocery store.  Baking soda. Pour a small amount into the bath as told by your health care provider.  Colloidal oatmeal. Follow the instructions on the packaging. You can get this at your local pharmacy or grocery store.  Try applying baking soda paste to your skin. Stir water into baking soda until it reaches a paste-like consistency.  Do not scratch or rub your skin.  Avoid covering the rash. Make sure the rash is exposed to air as much as possible.    General instructions    Avoid hot showers or baths, which can make itching worse. A cold shower may help.  Avoid scented soaps, detergents, and perfumes. Use gentle soaps, detergents, perfumes, and other cosmetic products.  Avoid any substance that causes your rash. Keep a journal to help track what causes your rash. Write down:  What you eat.  What cosmetic products you use.  What you drink.  What you wear. This includes jewelry.  Keep all follow-up visits as told by your health care provider. This is important.    Contact a health care provider if:  You sweat at night.  You lose weight.  You urinate more than normal.  You feel weak.  You vomit.  Your skin or the whites of your eyes look yellow (jaundice).  Your skin:  Tingles.  Is numb.  Your rash:  Does not go away after several days.  Gets worse.  You are:  Unusually thirsty.  More tired than normal.  You have:  New symptoms.  Pain in your abdomen.  A fever.  Diarrhea.    Get help right away if:  You develop a rash that covers all or most of your body. The rash may or may not be painful.  You develop blisters that:  Are on top of the rash.  Grow larger or grow together.  Are painful.  Are inside your nose or mouth.  You develop a rash that:  Looks like purple pinprick-sized spots all over your body.  Has a “bull's eye” or looks like a target.  Is not related to sun exposure, is red and painful, and causes your skin to peel.    ADDITIONAL NOTES AND INSTRUCTIONS    Please follow up with your Primary MD in 24-48 hr.  Seek immediate medical care for any new/worsening signs or symptoms.     Document Released: 12/8/2003 Document Revised: 5/23/2017 Document Reviewed: 5/4/2016  [employer] Interactive Patient Education ©2019 [employer] This information is not intended to replace advice given to you by your health care provider. Make sure you discuss any questions you have with your health care provider.

## 2022-06-15 NOTE — ED PROVIDER NOTE - NSICDXPASTMEDICALHX_GEN_ALL_CORE_FT
PAST MEDICAL HISTORY:  Anal fistula     Anxiety     Chronic hepatitis B No tx per pt.    Depression     Schizoid personality disorder paranoia per pt. Denies hallucinations

## 2024-10-30 NOTE — H&P PST ADULT - PULMONARY EMBOLUS
BP above goal at appointment today. BP is typically in range. Will continue current medication regimen and continue to monitor at this time.    no